# Patient Record
Sex: MALE | Race: WHITE | Employment: UNEMPLOYED | URBAN - METROPOLITAN AREA
[De-identification: names, ages, dates, MRNs, and addresses within clinical notes are randomized per-mention and may not be internally consistent; named-entity substitution may affect disease eponyms.]

---

## 2022-10-06 ENCOUNTER — APPOINTMENT (OUTPATIENT)
Dept: GENERAL RADIOLOGY | Age: 52
End: 2022-10-06
Payer: COMMERCIAL

## 2022-10-06 ENCOUNTER — HOSPITAL ENCOUNTER (EMERGENCY)
Age: 52
Discharge: HOME | End: 2022-10-06
Attending: EMERGENCY MEDICINE
Payer: COMMERCIAL

## 2022-10-06 VITALS
OXYGEN SATURATION: 98 % | RESPIRATION RATE: 16 BRPM | TEMPERATURE: 98.4 F | SYSTOLIC BLOOD PRESSURE: 142 MMHG | WEIGHT: 127 LBS | HEIGHT: 73 IN | DIASTOLIC BLOOD PRESSURE: 93 MMHG | BODY MASS INDEX: 16.83 KG/M2 | HEART RATE: 69 BPM

## 2022-10-06 DIAGNOSIS — L03.116 CELLULITIS OF LEFT LOWER EXTREMITY: Primary | ICD-10-CM

## 2022-10-06 PROCEDURE — 96372 THER/PROPH/DIAG INJ SC/IM: CPT

## 2022-10-06 PROCEDURE — 99284 EMERGENCY DEPT VISIT MOD MDM: CPT

## 2022-10-06 PROCEDURE — 6370000000 HC RX 637 (ALT 250 FOR IP): Performed by: EMERGENCY MEDICINE

## 2022-10-06 PROCEDURE — 6360000002 HC RX W HCPCS: Performed by: EMERGENCY MEDICINE

## 2022-10-06 PROCEDURE — 73630 X-RAY EXAM OF FOOT: CPT

## 2022-10-06 RX ORDER — NAPROXEN 500 MG/1
500 TABLET ORAL ONCE
Status: COMPLETED | OUTPATIENT
Start: 2022-10-06 | End: 2022-10-06

## 2022-10-06 RX ORDER — CEPHALEXIN 500 MG/1
500 CAPSULE ORAL 4 TIMES DAILY
Qty: 40 CAPSULE | Refills: 0 | Status: ON HOLD | OUTPATIENT
Start: 2022-10-06 | End: 2022-10-14 | Stop reason: HOSPADM

## 2022-10-06 RX ORDER — CEFTRIAXONE 1 G/1
1000 INJECTION, POWDER, FOR SOLUTION INTRAMUSCULAR; INTRAVENOUS ONCE
Status: COMPLETED | OUTPATIENT
Start: 2022-10-06 | End: 2022-10-06

## 2022-10-06 RX ORDER — NAPROXEN 500 MG/1
500 TABLET ORAL 2 TIMES DAILY
Qty: 10 TABLET | Refills: 0 | Status: SHIPPED | OUTPATIENT
Start: 2022-10-06

## 2022-10-06 RX ORDER — HYDROCODONE BITARTRATE AND ACETAMINOPHEN 5; 325 MG/1; MG/1
1 TABLET ORAL EVERY 6 HOURS PRN
Qty: 12 TABLET | Refills: 0 | Status: SHIPPED | OUTPATIENT
Start: 2022-10-06 | End: 2022-10-09

## 2022-10-06 RX ADMIN — NAPROXEN 500 MG: 500 TABLET ORAL at 11:14

## 2022-10-06 RX ADMIN — CEFTRIAXONE 1000 MG: 1 INJECTION, POWDER, FOR SOLUTION INTRAMUSCULAR; INTRAVENOUS at 11:15

## 2022-10-06 ASSESSMENT — PAIN SCALES - GENERAL
PAINLEVEL_OUTOF10: 0
PAINLEVEL_OUTOF10: 10

## 2022-10-06 ASSESSMENT — PAIN DESCRIPTION - ORIENTATION
ORIENTATION: LEFT
ORIENTATION: LEFT;ANTERIOR

## 2022-10-06 ASSESSMENT — PAIN - FUNCTIONAL ASSESSMENT
PAIN_FUNCTIONAL_ASSESSMENT: 0-10
PAIN_FUNCTIONAL_ASSESSMENT: 0-10

## 2022-10-06 ASSESSMENT — PAIN DESCRIPTION - ONSET: ONSET: ON-GOING

## 2022-10-06 ASSESSMENT — PAIN DESCRIPTION - PAIN TYPE: TYPE: CHRONIC PAIN

## 2022-10-06 ASSESSMENT — PAIN DESCRIPTION - LOCATION
LOCATION: FOOT
LOCATION: FOOT

## 2022-10-06 ASSESSMENT — PAIN DESCRIPTION - DESCRIPTORS
DESCRIPTORS: SHARP
DESCRIPTORS: THROBBING

## 2022-10-06 ASSESSMENT — PAIN DESCRIPTION - FREQUENCY: FREQUENCY: CONTINUOUS

## 2022-10-06 NOTE — ED PROVIDER NOTES
2000 \[facility]\"" ED  EMERGENCY DEPARTMENT ENCOUNTER      Pt Name: Marylou Alvarado  MRN: [id number]  [place] 1970  Date of evaluation: 10/6/2022  Provider: Miriam Bah DO    CHIEF COMPLAINT       Chief Complaint   Patient presents with    Foot Injury     Injured foot a year ago was seen at infectious disease doc in [place] given antibiotics. Went away and came back after medication was done. Complaint: Left foot redness and pain  History of chief complaint: This 65-year-old gentleman presents the emergency department complaining of redness and pain developing in the left foot. Patient states he noticed it feeling sore about 3 days ago started up with redness yesterday increased swelling redness and pain today, hurts to wear his shoe and walk. Patient states he has had a history of infection previously in that same area states he had originally injured his foot and at the top of his foot just above the little toe 4 years ago. Patient states previous infection about 2 years ago for which she was treated with antibiotics from infectious disease and it resolved. Patient states he has been good since that time states he does keep a little patch of dry skin over that area. Patient is not a diabetic patient denies any numb tingling or weakness to the extremity. No injury or trauma no fevers chills nausea vomiting weak or dizzy. Patient denies other complaints    Nursing Notes were reviewed. REVIEW OF SYSTEMS    (2-9 systems for level 4, 10 or more for level 5)     Review of Systems  Pertinent findings documented in the history of present illness  Except as noted above the remainder of the review of systems was reviewed and negative.        PAST MEDICAL HISTORY     Past Medical History:   Diagnosis Date    Asthma          SURGICAL HISTORY       Past Surgical History:   Procedure Laterality Date    KNEE SURGERY Left 2019    cartilage removed         CURRENT MEDICATIONS       Previous Medications ALBUTEROL (PROVENTIL HFA;VENTOLIN HFA) 108 (90 BASE) MCG/ACT INHALER    Inhale 2 puffs into the lungs every 6 hours as needed. ALLERGIES     Biaxin [clarithromycin] and Sodium pentobarbital [pentobarbital sodium]    FAMILY HISTORY     History reviewed. No pertinent family history. SOCIAL HISTORY       Social History     Socioeconomic History    Marital status: Single     Spouse name: None    Number of children: None    Years of education: None    Highest education level: None   Tobacco Use    Smoking status: Every Day     Packs/day: 0.50     Types: Cigarettes    Smokeless tobacco: Never   Vaping Use    Vaping Use: Never used   Substance and Sexual Activity    Alcohol use: Never    Drug use: Never       PHYSICAL EXAM    (up to 7 for level 4, 8 or more for level 5)     ED Triage Vitals [10/06/22 1036]   BP Temp Temp Source Heart Rate Resp SpO2 Height Weight   (!) 142/93 98.4 °F (36.9 °C) Temporal 69 16 98 % 6' 1\" (1.854 m) 127 lb (57.6 kg)       Physical Exam  General appearance: Patient is awake alert interactive appropriate nontoxic in no acute distress  Oral pharyngeal cavity is pink with good moisture, no exudates or ulcerations no asymmetry, the airway is widely patent  Neck: Supple no meningeal signs no adenopathy no JVD  Heart: Regular rate and rhythm no gross murmurs rubs or clicks  Lungs: Breath sounds are clear with good air movement throughout no active wheezes rales or rhonchi no respiratory distress  Abdomen: Soft nontender with good bowel sounds no rebound rigidity or guarding no firm or pulsatile masses, no gross distention, femoral pulses full and symmetric  Back: Nontender to palpation no costovertebral angle tenderness  Left foot: There is an area of patchy scaly dry skin over the dorsum of the foot between the area of the fourth and fifth metatarsals there is surrounding erythema and warmth with focal tenderness to palpation. ,  There is no lymphangitis, capillary refill is less than 2 seconds to the digits. There is palpable posterior tibial and dorsalis pedis pulse there is no pain on palpation about the ankle or lower leg no gross swelling. No asymmetry. DIAGNOSTIC RESULTS     RADIOLOGY:   Non-plain film images such as CT, Ultrasound and MRI are read by the radiologist. Plain radiographic images are visualized and preliminarily interpreted by the emergency physician with the below findings:      Interpretation per the Radiologist below, if available at the time of this note:    XR FOOT LEFT (MIN 3 VIEWS)    (Results Pending)         EMERGENCY DEPARTMENT COURSE and DIFFERENTIAL DIAGNOSIS/MDM:   Vitals:    Vitals:    10/06/22 1036   BP: (!) 142/93   Pulse: 69   Resp: 16   Temp: 98.4 °F (36.9 °C)   TempSrc: Temporal   SpO2: 98%   Weight: 127 lb (57.6 kg)   Height: 6' 1\" (1.854 m)     Treatment and course: Patient was given Rocephin 1 g IM initiated here, Ace wrap was applied for comfort and postop shoe given    FINAL IMPRESSION      1. Cellulitis of left lower extremity          DISPOSITION/PLAN   DISPOSITION    Patient discharged home advised rest warm soaks 3-4 times throughout the day home-going prescription for keflex for 10 days was written along with Naprosyn No. 10 Vicodin No. 12 patient advised to monitor closely and return if any change or worsening increasing redness discoloration numb tingling fever chills nausea or vomiting. Patient to follow-up with primary physician for repeat assessment in the next 2 days    PATIENT REFERRED TO:  Elvia Perales MD  [address]  [facility] [address] [phone]    In 2 days      DISCHARGE MEDICATIONS:  New Prescriptions    CEPHALEXIN (KEFLEX) 500 MG CAPSULE    Take 1 capsule by mouth 4 times daily for 10 days    HYDROCODONE-ACETAMINOPHEN (NORCO) 5-325 MG PER TABLET    Take 1 tablet by mouth every 6 hours as needed for Pain for up to 3 days. Intended supply: 3 days.  Take lowest dose possible to manage pain    NAPROXEN (NAPROSYN) 500 MG TABLET    Take 1 tablet by mouth 2 times daily     Controlled Substances Monitoring:     No flowsheet data found.     (Please note that portions of this note were completed with a voice recognition program.  Efforts were made to edit the dictations but occasionally words are mis-transcribed.)    Jelani Vogel DO (electronically signed)  Attending Emergency Physician            Jelani Vogel DO  10/06/22 6409

## 2022-10-06 NOTE — ED TRIAGE NOTES
Pt c/o left foot injury 1 year ago and had an infection. Not Pt returning to ER for c/o left foot swelling, redness and pain, onset 3 days ago. Pain rated 10/10, nothing taken for pain. Pt able to bear weight and walk but has pain with movement. Pt resp even, unlabored, skin w/d. Pt sent from Urgent Care to ER to be seen. Pt plan of care explained to Pt during bedside exam by Dr. Mandy Boyd.

## 2022-10-12 ENCOUNTER — HOSPITAL ENCOUNTER (INPATIENT)
Age: 52
LOS: 2 days | Discharge: HOME | DRG: 603 | End: 2022-10-14
Attending: EMERGENCY MEDICINE | Admitting: INTERNAL MEDICINE
Payer: COMMERCIAL

## 2022-10-12 DIAGNOSIS — Z78.9 FAILURE OF OUTPATIENT TREATMENT: ICD-10-CM

## 2022-10-12 DIAGNOSIS — L02.619 CELLULITIS AND ABSCESS OF FOOT: Primary | ICD-10-CM

## 2022-10-12 DIAGNOSIS — L03.119 CELLULITIS AND ABSCESS OF FOOT: Primary | ICD-10-CM

## 2022-10-12 PROBLEM — L03.90 CELLULITIS: Status: ACTIVE | Noted: 2022-10-12

## 2022-10-12 LAB
ALBUMIN SERPL-MCNC: 3.6 G/DL (ref 3.5–4.6)
ALP BLD-CCNC: 100 U/L (ref 35–104)
ALT SERPL-CCNC: 9 U/L (ref 0–41)
ANION GAP SERPL CALCULATED.3IONS-SCNC: 10 MEQ/L (ref 9–15)
AST SERPL-CCNC: 12 U/L (ref 0–40)
BASOPHILS ABSOLUTE: 0.1 K/UL (ref 0–0.1)
BASOPHILS RELATIVE PERCENT: 0.6 % (ref 0.2–1.2)
BILIRUB SERPL-MCNC: 0.3 MG/DL (ref 0.2–0.7)
BUN BLDV-MCNC: 11 MG/DL (ref 6–20)
CALCIUM SERPL-MCNC: 8.9 MG/DL (ref 8.5–9.9)
CHLORIDE BLD-SCNC: 105 MEQ/L (ref 95–107)
CO2: 25 MEQ/L (ref 20–31)
CREAT SERPL-MCNC: 0.91 MG/DL (ref 0.7–1.2)
EOSINOPHILS ABSOLUTE: 0.1 K/UL (ref 0–0.5)
EOSINOPHILS RELATIVE PERCENT: 1.5 % (ref 0.8–7)
GFR AFRICAN AMERICAN: >60
GFR NON-AFRICAN AMERICAN: >60
GLOBULIN: 3.1 G/DL (ref 2.3–3.5)
GLUCOSE BLD-MCNC: 113 MG/DL (ref 70–99)
HCT VFR BLD CALC: 42.4 % (ref 42–52)
HEMOGLOBIN: 13.6 G/DL (ref 13.7–17.5)
IMMATURE GRANULOCYTES #: 0 K/UL
IMMATURE GRANULOCYTES %: 0.2 %
LYMPHOCYTES ABSOLUTE: 2.8 K/UL (ref 1.3–3.6)
LYMPHOCYTES RELATIVE PERCENT: 31.9 %
MCH RBC QN AUTO: 30 PG (ref 25.7–32.2)
MCHC RBC AUTO-ENTMCNC: 32.1 % (ref 32.3–36.5)
MCV RBC AUTO: 93.6 FL (ref 79–92.2)
MONOCYTES ABSOLUTE: 0.5 K/UL (ref 0.3–0.8)
MONOCYTES RELATIVE PERCENT: 5.5 % (ref 5.3–12.2)
NEUTROPHILS ABSOLUTE: 5.3 K/UL (ref 1.8–5.4)
NEUTROPHILS RELATIVE PERCENT: 60.3 % (ref 34–67.9)
PDW BLD-RTO: 12.3 % (ref 11.6–14.4)
PLATELET # BLD: 309 K/UL (ref 163–337)
POTASSIUM SERPL-SCNC: 3.8 MEQ/L (ref 3.4–4.9)
RBC # BLD: 4.53 M/UL (ref 4.63–6.08)
SEDIMENTATION RATE, ERYTHROCYTE: 26 MM (ref 0–20)
SODIUM BLD-SCNC: 140 MEQ/L (ref 135–144)
TOTAL PROTEIN: 6.7 G/DL (ref 6.3–8)
WBC # BLD: 8.8 K/UL (ref 4.2–9)

## 2022-10-12 PROCEDURE — 80053 COMPREHEN METABOLIC PANEL: CPT

## 2022-10-12 PROCEDURE — 6370000000 HC RX 637 (ALT 250 FOR IP): Performed by: EMERGENCY MEDICINE

## 2022-10-12 PROCEDURE — 85025 COMPLETE CBC W/AUTO DIFF WBC: CPT

## 2022-10-12 PROCEDURE — 2580000003 HC RX 258: Performed by: EMERGENCY MEDICINE

## 2022-10-12 PROCEDURE — 87040 BLOOD CULTURE FOR BACTERIA: CPT

## 2022-10-12 PROCEDURE — 2580000003 HC RX 258: Performed by: INTERNAL MEDICINE

## 2022-10-12 PROCEDURE — 96365 THER/PROPH/DIAG IV INF INIT: CPT

## 2022-10-12 PROCEDURE — 6360000002 HC RX W HCPCS: Performed by: EMERGENCY MEDICINE

## 2022-10-12 PROCEDURE — 96375 TX/PRO/DX INJ NEW DRUG ADDON: CPT

## 2022-10-12 PROCEDURE — 99285 EMERGENCY DEPT VISIT HI MDM: CPT

## 2022-10-12 PROCEDURE — 85652 RBC SED RATE AUTOMATED: CPT

## 2022-10-12 PROCEDURE — 36415 COLL VENOUS BLD VENIPUNCTURE: CPT

## 2022-10-12 PROCEDURE — 6370000000 HC RX 637 (ALT 250 FOR IP): Performed by: INTERNAL MEDICINE

## 2022-10-12 PROCEDURE — 6360000002 HC RX W HCPCS: Performed by: INTERNAL MEDICINE

## 2022-10-12 PROCEDURE — 1210000000 HC MED SURG R&B

## 2022-10-12 RX ORDER — SODIUM CHLORIDE 0.9 % (FLUSH) 0.9 %
10 SYRINGE (ML) INJECTION PRN
Status: DISCONTINUED | OUTPATIENT
Start: 2022-10-12 | End: 2022-10-14 | Stop reason: HOSPADM

## 2022-10-12 RX ORDER — SODIUM CHLORIDE 0.9 % (FLUSH) 0.9 %
3 SYRINGE (ML) INJECTION EVERY 8 HOURS
Status: DISCONTINUED | OUTPATIENT
Start: 2022-10-12 | End: 2022-10-14 | Stop reason: HOSPADM

## 2022-10-12 RX ORDER — DIAPER,BRIEF,INFANT-TODD,DISP
EACH MISCELLANEOUS ONCE
Status: COMPLETED | OUTPATIENT
Start: 2022-10-12 | End: 2022-10-12

## 2022-10-12 RX ORDER — KETOROLAC TROMETHAMINE 30 MG/ML
30 INJECTION, SOLUTION INTRAMUSCULAR; INTRAVENOUS ONCE
Status: COMPLETED | OUTPATIENT
Start: 2022-10-12 | End: 2022-10-12

## 2022-10-12 RX ORDER — ONDANSETRON 2 MG/ML
4 INJECTION INTRAMUSCULAR; INTRAVENOUS EVERY 6 HOURS PRN
Status: DISCONTINUED | OUTPATIENT
Start: 2022-10-12 | End: 2022-10-14 | Stop reason: HOSPADM

## 2022-10-12 RX ORDER — SODIUM CHLORIDE 0.9 % (FLUSH) 0.9 %
10 SYRINGE (ML) INJECTION EVERY 12 HOURS SCHEDULED
Status: DISCONTINUED | OUTPATIENT
Start: 2022-10-12 | End: 2022-10-14 | Stop reason: HOSPADM

## 2022-10-12 RX ORDER — SODIUM CHLORIDE 9 MG/ML
INJECTION, SOLUTION INTRAVENOUS CONTINUOUS
Status: DISPENSED | OUTPATIENT
Start: 2022-10-12 | End: 2022-10-13

## 2022-10-12 RX ORDER — ALBUTEROL SULFATE 90 UG/1
2 AEROSOL, METERED RESPIRATORY (INHALATION) EVERY 4 HOURS PRN
Status: DISCONTINUED | OUTPATIENT
Start: 2022-10-12 | End: 2022-10-14 | Stop reason: HOSPADM

## 2022-10-12 RX ORDER — OXYCODONE HYDROCHLORIDE 5 MG/1
5 TABLET ORAL EVERY 4 HOURS PRN
Status: DISCONTINUED | OUTPATIENT
Start: 2022-10-12 | End: 2022-10-14 | Stop reason: HOSPADM

## 2022-10-12 RX ORDER — SODIUM CHLORIDE 9 MG/ML
INJECTION, SOLUTION INTRAVENOUS CONTINUOUS
Status: DISCONTINUED | OUTPATIENT
Start: 2022-10-12 | End: 2022-10-12

## 2022-10-12 RX ORDER — SODIUM CHLORIDE 9 MG/ML
INJECTION, SOLUTION INTRAVENOUS PRN
Status: DISCONTINUED | OUTPATIENT
Start: 2022-10-12 | End: 2022-10-14 | Stop reason: HOSPADM

## 2022-10-12 RX ORDER — ACETAMINOPHEN 650 MG/1
650 SUPPOSITORY RECTAL EVERY 6 HOURS PRN
Status: DISCONTINUED | OUTPATIENT
Start: 2022-10-12 | End: 2022-10-14 | Stop reason: HOSPADM

## 2022-10-12 RX ORDER — ENOXAPARIN SODIUM 100 MG/ML
40 INJECTION SUBCUTANEOUS DAILY
Status: DISCONTINUED | OUTPATIENT
Start: 2022-10-12 | End: 2022-10-14 | Stop reason: HOSPADM

## 2022-10-12 RX ORDER — PROMETHAZINE HYDROCHLORIDE 12.5 MG/1
12.5 TABLET ORAL EVERY 6 HOURS PRN
Status: DISCONTINUED | OUTPATIENT
Start: 2022-10-12 | End: 2022-10-14 | Stop reason: HOSPADM

## 2022-10-12 RX ORDER — ACETAMINOPHEN 325 MG/1
650 TABLET ORAL EVERY 6 HOURS PRN
Status: DISCONTINUED | OUTPATIENT
Start: 2022-10-12 | End: 2022-10-14 | Stop reason: HOSPADM

## 2022-10-12 RX ORDER — POLYETHYLENE GLYCOL 3350 17 G/17G
17 POWDER, FOR SOLUTION ORAL DAILY PRN
Status: DISCONTINUED | OUTPATIENT
Start: 2022-10-12 | End: 2022-10-14 | Stop reason: HOSPADM

## 2022-10-12 RX ADMIN — ACETAMINOPHEN 650 MG: 325 TABLET ORAL at 20:37

## 2022-10-12 RX ADMIN — VANCOMYCIN HYDROCHLORIDE 1000 MG: 1 INJECTION, POWDER, LYOPHILIZED, FOR SOLUTION INTRAVENOUS at 17:14

## 2022-10-12 RX ADMIN — SODIUM CHLORIDE: 9 INJECTION, SOLUTION INTRAVENOUS at 17:13

## 2022-10-12 RX ADMIN — SODIUM CHLORIDE: 9 INJECTION, SOLUTION INTRAVENOUS at 20:30

## 2022-10-12 RX ADMIN — BACITRACIN: 500 OINTMENT TOPICAL at 17:15

## 2022-10-12 RX ADMIN — OXYCODONE 5 MG: 5 TABLET ORAL at 22:50

## 2022-10-12 RX ADMIN — ENOXAPARIN SODIUM 40 MG: 100 INJECTION SUBCUTANEOUS at 20:40

## 2022-10-12 RX ADMIN — PIPERACILLIN AND TAZOBACTAM 3375 MG: 3; .375 INJECTION, POWDER, FOR SOLUTION INTRAVENOUS at 20:37

## 2022-10-12 RX ADMIN — KETOROLAC TROMETHAMINE 30 MG: 30 INJECTION, SOLUTION INTRAMUSCULAR; INTRAVENOUS at 17:11

## 2022-10-12 RX ADMIN — KETOROLAC TROMETHAMINE 30 MG: 30 INJECTION, SOLUTION INTRAMUSCULAR; INTRAVENOUS at 22:49

## 2022-10-12 ASSESSMENT — ENCOUNTER SYMPTOMS
CHEST TIGHTNESS: 0
SHORTNESS OF BREATH: 0
BACK PAIN: 0
BLOOD IN STOOL: 0
DIARRHEA: 0
WHEEZING: 0
TROUBLE SWALLOWING: 0
EYE DISCHARGE: 0
COUGH: 0
FACIAL SWELLING: 0
VOICE CHANGE: 0
ABDOMINAL PAIN: 0
SINUS PRESSURE: 0
SORE THROAT: 0
EYE REDNESS: 0
EYE PAIN: 0
VOMITING: 0
COLOR CHANGE: 1
CHOKING: 0
CONSTIPATION: 0
STRIDOR: 0

## 2022-10-12 ASSESSMENT — PAIN DESCRIPTION - ORIENTATION
ORIENTATION: LEFT

## 2022-10-12 ASSESSMENT — PAIN SCALES - GENERAL
PAINLEVEL_OUTOF10: 5
PAINLEVEL_OUTOF10: 6
PAINLEVEL_OUTOF10: 9
PAINLEVEL_OUTOF10: 10
PAINLEVEL_OUTOF10: 9

## 2022-10-12 ASSESSMENT — PAIN DESCRIPTION - LOCATION
LOCATION: FOOT

## 2022-10-12 ASSESSMENT — PAIN DESCRIPTION - DESCRIPTORS
DESCRIPTORS: THROBBING
DESCRIPTORS: SHARP;THROBBING

## 2022-10-12 NOTE — ED TRIAGE NOTES
Patient to ER with foot infection that has been ongoing for a few weeks. Family Doctor told patient to come to ER. Drainage present.  Electronically signed by Yoana Becerra RN on 10/12/2022 at 4:52 PM

## 2022-10-12 NOTE — ED NOTES
[id number] Dr Clemencia Mcmullen notified      Liss Eufemia  10/12/22 [phone]

## 2022-10-12 NOTE — ED PROVIDER NOTES
2000 [facility] ED  eMERGENCY dEPARTMENT eNCOUnter      Pt Name: Rissa Traore  MRN: [id number]  [place] 1970  Date of evaluation: 10/12/2022  Provider: Xenia Doyle MD    [address] [place] [address]       Chief Complaint   Patient presents with    Wound Infection     Left Foot          HISTORY OF PRESENT ILLNESS   (Location/Symptom, Timing/Onset,Context/Setting, Quality, Duration, Modifying Factors, Severity)  Note limiting factors. Rissa Traore is a 45 y.o. male who presents to the emergency department patient had failed outpatient treatment with Keflex seen by primary care physician in his office today and sent here for inpatient care patient was seen in this emergency on the sixth of this month x-ray was performed no foreign body no fracture noticeable on further x-ray patient has no fever no history diabetes mellitus patient noted to have redness and a scab on the left foot scab broke and drained patient has open sore on the dorsum of the left foot below his fourth and fifth toe along with redness of his foot and no numbness in his toes at the patient he had similar infection in the foot before and responded to IV antibiotics in the past  HPI    NursingNotes were reviewed. REVIEW OF SYSTEMS    (2-9 systems for level 4, 10 or more for level 5)     Review of Systems   Constitutional: Negative. Negative for activity change and fever. HENT:  Negative for congestion, drooling, facial swelling, mouth sores, nosebleeds, sinus pressure, sore throat, trouble swallowing and voice change. Eyes:  Negative for pain, discharge, redness and visual disturbance. Respiratory:  Negative for cough, choking, chest tightness, shortness of breath, wheezing and stridor. Cardiovascular:  Negative for chest pain, palpitations and leg swelling. Gastrointestinal:  Negative for abdominal pain, blood in stool, constipation, diarrhea and vomiting. Endocrine: Negative for cold intolerance, polyphagia and polyuria. Genitourinary:  Negative for dysuria, flank pain, frequency, genital sores and urgency. Musculoskeletal:  Negative for back pain, joint swelling, neck pain and neck stiffness. Skin:  Positive for color change and wound. Negative for pallor and rash. Neurological:  Negative for tremors, seizures, syncope, weakness, numbness and headaches. Hematological:  Negative for adenopathy. Does not bruise/bleed easily. Psychiatric/Behavioral:  Negative for agitation, behavioral problems, hallucinations and sleep disturbance. The patient is not hyperactive. All other systems reviewed and are negative. Except as noted above the remainder of the review of systems was reviewed and negative. PAST MEDICAL HISTORY     Past Medical History:   Diagnosis Date    Asthma     Emphysema lung ([address]          SURGICALHISTORY       Past Surgical History:   Procedure Laterality Date    KNEE SURGERY Left 2019    cartilage removed         CURRENT MEDICATIONS       Previous Medications    ALBUTEROL (PROVENTIL HFA;VENTOLIN HFA) 108 (90 BASE) MCG/ACT INHALER    Inhale 2 puffs into the lungs every 6 hours as needed. CEPHALEXIN (KEFLEX) 500 MG CAPSULE    Take 1 capsule by mouth 4 times daily for 10 days    NAPROXEN (NAPROSYN) 500 MG TABLET    Take 1 tablet by mouth 2 times daily       ALLERGIES     Biaxin [clarithromycin] and Sodium pentobarbital [pentobarbital sodium]    FAMILY HISTORY     History reviewed. No pertinent family history.        SOCIAL HISTORY       Social History     Socioeconomic History    Marital status: Single     Spouse name: None    Number of children: None    Years of education: None    Highest education level: None   Tobacco Use    Smoking status: Every Day     Packs/day: 0.50     Types: Cigarettes    Smokeless tobacco: Never   Vaping Use    Vaping Use: Never used   Substance and Sexual Activity    Alcohol use: Never    Drug use: Never       SCREENINGS    Moss Coma Scale  Eye Opening: Spontaneous  Best Verbal Response: Oriented  Best Motor Response: Obeys commands  Jomar Coma Scale Score: 15 @FLOW(99999862)@      PHYSICAL EXAM    (up to 7 for level 4, 8 or more for level 5)     ED Triage Vitals [10/12/22 1647]   BP Temp Temp Source Heart Rate Resp SpO2 Height Weight   136/77 97.6 °F (36.4 °C) Oral 57 20 99 % -- --       Physical Exam  Vitals and nursing note reviewed. Constitutional:       General: He is not in acute distress. Appearance: He is normal weight. He is not ill-appearing, toxic-appearing or diaphoretic. Comments: Alert cooperative patient nontoxic appearance ambulatory otherwise   HENT:      Head: Normocephalic. Right Ear: Tympanic membrane, ear canal and external ear normal.      Left Ear: Tympanic membrane and ear canal normal.      Nose: Nose normal.      Mouth/Throat:      Mouth: Mucous membranes are moist.   Eyes:      General:         Left eye: No discharge. Neck:      Vascular: No carotid bruit. Cardiovascular:      Rate and Rhythm: Normal rate and regular rhythm. Pulses: Normal pulses. Heart sounds: Normal heart sounds. No murmur heard. No friction rub. No gallop. Pulmonary:      Breath sounds: No stridor. No wheezing, rhonchi or rales. Chest:      Chest wall: No tenderness. Abdominal:      General: Bowel sounds are normal. There is no distension. Palpations: There is no mass. Tenderness: There is no abdominal tenderness. There is no right CVA tenderness, left CVA tenderness, guarding or rebound. Hernia: No hernia is present. Musculoskeletal:         General: Swelling and tenderness present. Cervical back: Neck supple. No rigidity or tenderness. Lymphadenopathy:      Cervical: No cervical adenopathy. Skin:     Coloration: Skin is not jaundiced. Findings: Erythema present. No bruising or lesion. Neurological:      General: No focal deficit present. Mental Status: He is alert. Cranial Nerves:  No cranial nerve deficit. Sensory: No sensory deficit. Motor: No weakness. Coordination: Coordination normal.      Gait: Gait normal.      Deep Tendon Reflexes: Reflexes normal.   Psychiatric:         Mood and Affect: Mood normal.       DIAGNOSTIC RESULTS     EKG: All EKG's are interpreted by the Emergency Department Physician who either signs or Co-signsthis chart in the absence of a cardiologist.        RADIOLOGY:   [place] such as CT, Ultrasound and MRI are read by the radiologist. Plain radiographic images are visualized and preliminarily interpreted by the emergency physician with the below findings:        Interpretation per the Radiologist below, if available at the time ofthis note:    No orders to display         ED BEDSIDE ULTRASOUND:   Performed by ED Physician - none    LABS:  Labs Reviewed   COMPREHENSIVE METABOLIC PANEL - Abnormal; Notable for the following components:       Result Value    Glucose 113 (*)     All other components within normal limits   CBC WITH AUTO DIFFERENTIAL - Abnormal; Notable for the following components:    RBC 4.53 (*)     Hemoglobin 13.6 (*)     MCV 93.6 (*)     MCHC 32.1 (*)     All other components within normal limits   CULTURE, BLOOD 1   CULTURE, BLOOD 2   SEDIMENTATION RATE       All other labs were within normal range or not returned as of this dictation.     EMERGENCY DEPARTMENT COURSE and DIFFERENTIAL DIAGNOSIS/MDM:   Vitals:    Vitals:    10/12/22 1647   BP: 136/77   Pulse: 57   Resp: 20   Temp: 97.6 °F (36.4 °C)   TempSrc: Oral   SpO2: 99%     MDM  Number of Diagnoses or Management Options  Cellulitis and abscess of foot  Failure of outpatient treatment  Diagnosis management comments: Patient was seen in this emergency on the sixth of this month started with Keflex x-ray was performed which did not show any foreign body or fracture patient has scab formation which broke loose and drained few days ago as per patient has a swelling and redness to the foot patient seen by his primary care physician today and sent here for inpatient care as redness is spreading in foot is swollen Case discussed with Dr. Moses De Leon, further specialist for her consultation agrees to see the patient requesting a admission to the hospitalist patient started with vancomycin IV, discussed with the hospitalist Dr. Madisyn Hidalgo at [phone] hrs. patient accepted for admission to the hospital       Amount and/or Complexity of Data Reviewed  Clinical lab tests: ordered and reviewed        CRITICAL CARE TIME   Total Critical Care time was minutes, excluding separately reportableprocedures. There was a high probability of clinicallysignificant/life threatening deterioration in the patient's condition which required my urgent intervention. CONSULTS:  None    PROCEDURES:  Unless otherwise noted below, none     Procedures    FINAL IMPRESSION      1. Cellulitis and abscess of foot    2. Failure of outpatient treatment          DISPOSITION/PLAN   DISPOSITION Decision To Admit 10/12/2022 05:43:35 PM      PATIENT REFERRED TO:  No follow-up provider specified.     DISCHARGE MEDICATIONS:  New Prescriptions    No medications on file          (Please note that portions of this note were completed with a voice recognition program.  Efforts were made to edit the dictations but occasionally words are mis-transcribed.)    Alan Sheikh MD (electronically signed)  Attending Emergency Physician        Alan Sheikh MD  10/12/22 3098

## 2022-10-12 NOTE — ED NOTES
Wound cleansed and dressed. Pt tolerated well.  Electronically signed by Lauren Salcedo RN on 10/12/2022 at 5:33 PM       Lauren Salcedo RN  10/12/22 [id number]

## 2022-10-13 LAB
ANION GAP SERPL CALCULATED.3IONS-SCNC: 7 MEQ/L (ref 9–15)
BASOPHILS ABSOLUTE: 0 K/UL (ref 0–0.1)
BASOPHILS RELATIVE PERCENT: 0.3 % (ref 0.2–1.2)
BUN BLDV-MCNC: 14 MG/DL (ref 6–20)
CALCIUM SERPL-MCNC: 8.2 MG/DL (ref 8.5–9.9)
CHLORIDE BLD-SCNC: 110 MEQ/L (ref 95–107)
CO2: 26 MEQ/L (ref 20–31)
CREAT SERPL-MCNC: 0.87 MG/DL (ref 0.7–1.2)
EOSINOPHILS ABSOLUTE: 0.1 K/UL (ref 0–0.5)
EOSINOPHILS RELATIVE PERCENT: 1.6 % (ref 0.8–7)
GFR AFRICAN AMERICAN: >60
GFR NON-AFRICAN AMERICAN: >60
GLUCOSE BLD-MCNC: 121 MG/DL (ref 70–99)
HCT VFR BLD CALC: 36.2 % (ref 42–52)
HEMOGLOBIN: 11.7 G/DL (ref 13.7–17.5)
IMMATURE GRANULOCYTES #: 0 K/UL
IMMATURE GRANULOCYTES %: 0.1 %
LYMPHOCYTES ABSOLUTE: 2.5 K/UL (ref 1.3–3.6)
LYMPHOCYTES RELATIVE PERCENT: 36 %
MCH RBC QN AUTO: 30.5 PG (ref 25.7–32.2)
MCHC RBC AUTO-ENTMCNC: 32.3 % (ref 32.3–36.5)
MCV RBC AUTO: 94.5 FL (ref 79–92.2)
MONOCYTES ABSOLUTE: 0.5 K/UL (ref 0.3–0.8)
MONOCYTES RELATIVE PERCENT: 6.9 % (ref 5.3–12.2)
NEUTROPHILS ABSOLUTE: 3.8 K/UL (ref 1.8–5.4)
NEUTROPHILS RELATIVE PERCENT: 55.1 % (ref 34–67.9)
PDW BLD-RTO: 12.3 % (ref 11.6–14.4)
PLATELET # BLD: 263 K/UL (ref 163–337)
POTASSIUM REFLEX MAGNESIUM: 3.8 MEQ/L (ref 3.4–4.9)
RBC # BLD: 3.83 M/UL (ref 4.63–6.08)
SODIUM BLD-SCNC: 143 MEQ/L (ref 135–144)
WBC # BLD: 6.9 K/UL (ref 4.2–9)

## 2022-10-13 PROCEDURE — 36415 COLL VENOUS BLD VENIPUNCTURE: CPT

## 2022-10-13 PROCEDURE — 6370000000 HC RX 637 (ALT 250 FOR IP): Performed by: INTERNAL MEDICINE

## 2022-10-13 PROCEDURE — 6370000000 HC RX 637 (ALT 250 FOR IP): Performed by: PODIATRIST

## 2022-10-13 PROCEDURE — 2580000003 HC RX 258: Performed by: INTERNAL MEDICINE

## 2022-10-13 PROCEDURE — 2580000003 HC RX 258: Performed by: EMERGENCY MEDICINE

## 2022-10-13 PROCEDURE — 80048 BASIC METABOLIC PNL TOTAL CA: CPT

## 2022-10-13 PROCEDURE — 1210000000 HC MED SURG R&B

## 2022-10-13 PROCEDURE — 85025 COMPLETE CBC W/AUTO DIFF WBC: CPT

## 2022-10-13 PROCEDURE — 6360000002 HC RX W HCPCS: Performed by: INTERNAL MEDICINE

## 2022-10-13 RX ORDER — NICOTINE 21 MG/24HR
1 PATCH, TRANSDERMAL 24 HOURS TRANSDERMAL DAILY
Status: DISCONTINUED | OUTPATIENT
Start: 2022-10-13 | End: 2022-10-14 | Stop reason: HOSPADM

## 2022-10-13 RX ADMIN — ENOXAPARIN SODIUM 40 MG: 100 INJECTION SUBCUTANEOUS at 09:04

## 2022-10-13 RX ADMIN — SODIUM CHLORIDE, PRESERVATIVE FREE 10 ML: 5 INJECTION INTRAVENOUS at 20:12

## 2022-10-13 RX ADMIN — DEXTROSE MONOHYDRATE 750 MG: 5 INJECTION, SOLUTION INTRAVENOUS at 16:44

## 2022-10-13 RX ADMIN — PIPERACILLIN AND TAZOBACTAM 3375 MG: 3; .375 INJECTION, POWDER, FOR SOLUTION INTRAVENOUS at 13:04

## 2022-10-13 RX ADMIN — PIPERACILLIN AND TAZOBACTAM 3375 MG: 3; .375 INJECTION, POWDER, FOR SOLUTION INTRAVENOUS at 04:52

## 2022-10-13 RX ADMIN — DEXTROSE MONOHYDRATE 750 MG: 5 INJECTION, SOLUTION INTRAVENOUS at 05:00

## 2022-10-13 RX ADMIN — Medication 3 ML: at 09:05

## 2022-10-13 RX ADMIN — PIPERACILLIN AND TAZOBACTAM 3375 MG: 3; .375 INJECTION, POWDER, FOR SOLUTION INTRAVENOUS at 20:15

## 2022-10-13 RX ADMIN — OXYCODONE 5 MG: 5 TABLET ORAL at 20:12

## 2022-10-13 RX ADMIN — SODIUM CHLORIDE, PRESERVATIVE FREE 10 ML: 5 INJECTION INTRAVENOUS at 09:05

## 2022-10-13 RX ADMIN — Medication 3 ML: at 16:39

## 2022-10-13 RX ADMIN — HYOSCYAMINE SULFATE: 16 SOLUTION at 16:38

## 2022-10-13 ASSESSMENT — PAIN SCALES - GENERAL
PAINLEVEL_OUTOF10: 0
PAINLEVEL_OUTOF10: 5
PAINLEVEL_OUTOF10: 3
PAINLEVEL_OUTOF10: 7

## 2022-10-13 ASSESSMENT — PAIN DESCRIPTION - DESCRIPTORS
DESCRIPTORS: ACHING
DESCRIPTORS: ACHING

## 2022-10-13 ASSESSMENT — PAIN DESCRIPTION - FREQUENCY: FREQUENCY: CONTINUOUS

## 2022-10-13 ASSESSMENT — PAIN DESCRIPTION - LOCATION
LOCATION: FOOT
LOCATION: FOOT

## 2022-10-13 ASSESSMENT — PAIN DESCRIPTION - ORIENTATION
ORIENTATION: LEFT
ORIENTATION: LEFT

## 2022-10-13 ASSESSMENT — PAIN DESCRIPTION - PAIN TYPE: TYPE: ACUTE PAIN

## 2022-10-13 ASSESSMENT — PAIN DESCRIPTION - ONSET: ONSET: ON-GOING

## 2022-10-13 NOTE — CONSULTS
PODIATRIC MEDICINE AND SURGERY       CONSULT HISTORY AND PHYSICAL       ASSESSMENT:  This 45 y.o. male with infected open wound left foot      Plan:  Patient examined and evaluated  Previous and today's labs reviewed   Prior imaging reviewed   FINDINGS:   No fracture or dislocation. No findings of acute osteomyelitis. Normal soft   tissues. Dressing change orders placed for dakins wet to dry, patient instructed on continuing at home  No debridement indicated today     Discussed with patient outpatient wound care management. HPI: This very pleasant 45y.o. year old male with PMH of asthma, emphysema seen today for left dorsal foot wound. Patient states that it started as an infected blister which drained pus about 2 days ago, noted improvement in pain after that occurred. States he was sent to ED as wound had not healed with outpatient oral antibiotics. States he had similar symptoms that started as a rash between his toes left foot a few years ago. Patient deniea nausea, vomiting, diarrhea, fevers, chills, chest pain, shortness of breath, head ache, or calf pain. No other pedal complaints. Past Medical History:   Diagnosis Date    Asthma     Emphysema lung ([facility] [address]        Past Surgical History:   Procedure Laterality Date    KNEE SURGERY Left 2019    cartilage removed       [unfilled]    Allergies   Allergen Reactions    Biaxin [Clarithromycin]     Sodium Pentobarbital [Pentobarbital Sodium]        History reviewed. No pertinent family history.     Social History     Socioeconomic History    Marital status: Single     Spouse name: Not on file    Number of children: Not on file    Years of education: Not on file    Highest education level: Not on file   Occupational History    Not on file   Tobacco Use    Smoking status: Every Day     Packs/day: 0.50     Types: Cigarettes    Smokeless tobacco: Never   Vaping Use    Vaping Use: Never used   Substance and Sexual Activity    Alcohol use: Never Drug use: Never    Sexual activity: Not on file   Other Topics Concern    Not on file   Social History Narrative    Not on file     Social Determinants of Health     Financial Resource Strain: Not on file   Food Insecurity: Not on file   Transportation Needs: Not on file   Physical Activity: Not on file   Stress: Not on file   Social Connections: Not on file   Intimate Partner Violence: Not on file   Housing Stability: Not on file         REVIEW OF SYSTEMS:  CONSTITUTIONAL:  No fevers, chills, nightsweats, unintended weight loss  HEENT:  Denies frequent or severe headaches, nasal congestion/sinus symptoms, problematic allergy problems. EYES:  No diplopia or blurry vision. CARDIOVASCULAR:  No chest pain, dyspnea, palpitations, orthopnea  PULM:  No dyspnea, unexplained cough. GI:  No dysphagia/odynophagia, problematic reflux, constipation, diarrhea, changes in stool habits, hematochezia, melena. :  No new urinary complaints, including dysuria, gross hematuria or pyuria. NEURO:  No new balance problems, peripheral weakness/paresthesias or numbness of concern. MUSC-SKEL:  No new joint pain, swelling, or erythema. PSY:  No concerns regarding depression, anxiety or panic. INTEGUMENTARY:  No new skin changes (rash, new or changing mole, new growth)      OBJECTIVE:  BP (!) 120/59   Pulse (!) 41   Temp 97.5 °F (36.4 °C)   Resp 16   Ht 6' 1\" (1.854 m)   Wt 128 lb 6.4 oz (58.2 kg)   SpO2 97%   BMI 16.94 kg/m²   Patient is alert and oriented x 3 in NAD.      Vascular:   +2 palpable Dorsalis Pedis and Posterior Tibial Pulses B/L  Capillary Fill time < 3 seconds to B/L digits  Skin temperature warm to warm tibial tuberosity to the digits B/L  Hair growth positive to digits  Mild focal edema and erythema to periwound area    Neurological:   Epicritic sensation intact B/L    Musculoskeletal/Orthopaedic:   Structural Deformities: none noted  5/5 muscle strength Dorsiflexion, Plantarflexion, Inversion, Eversion

## 2022-10-13 NOTE — PROGRESS NOTES
Assumed patient care. He is awake in bed and talking on phone. A&O x 4. Pleasant and cooperative. Respirations even and unlabored on room air. Up ad maria in room. C/O pain in left foot. Will medicate per orders. No other complaints voiced. No s/s distress noted.

## 2022-10-13 NOTE — PROGRESS NOTES
Patient arrives to room 212 in w/c accompanied by this nurse. Transfers self into bed. A&O x 4. Pleasant and cooperative. Respirations even and unlabored. Oriented to room and bed controls. Please see flowsheets for complete assessment.

## 2022-10-13 NOTE — H&P
Hospital Medicine History & Physical      PCP: Jana Kilpatrick MD    Date of Admission: 10/12/2022    Date of Service: 10/13/22      Chief Complaint:  L dorsal foot wound      History Of Present Illness:  45 y.o. male who presented to [facility] with above complains. He has chronic R foot ulcer for the past 3-4 years with previous treatment by atbs, about last 2 weeks noted increased redness/edema/pain in L foot, came to ER week ago and was prescribed PO keflex. Was reevaluated in PCP office and was advised to reevaluated in ER due to failure PO atbs therapy. Denied fever, dyspnea, CP. After initial stabilization he was admitted for further management        Past Medical History:          Diagnosis Date    Asthma     Emphysema lung ([address]        Past Surgical History:          Procedure Laterality Date    KNEE SURGERY Left 2019    cartilage removed       Medications Prior to Admission:      Prior to Admission medications    Medication Sig Start Date End Date Taking? Authorizing Provider   cephALEXin (KEFLEX) 500 MG capsule Take 1 capsule by mouth 4 times daily for 10 days 10/6/22 10/16/22  Minus Pile, DO   naproxen (NAPROSYN) 500 MG tablet Take 1 tablet by mouth 2 times daily 10/6/22   Minus Pile, DO   albuterol (PROVENTIL HFA;VENTOLIN HFA) 108 (90 BASE) MCG/ACT inhaler Inhale 2 puffs into the lungs every 6 hours as needed. Historical Provider, MD       Allergies:  Biaxin [clarithromycin] and Sodium pentobarbital [pentobarbital sodium]    Social History:      The patient currently lives home    TOBACCO:   reports that he has been smoking cigarettes. He has been smoking an average of .5 packs per day. He has never used smokeless tobacco.  ETOH:   reports no history of alcohol use. Family History:       Reviewed in detail and negative for DM, CAD, Cancer, CVA. Positive as follows:    History reviewed. No pertinent family history. REVIEW OF SYSTEMS:   Pertinent positives as noted in the HPI. All other systems reviewed and negative. PHYSICAL EXAM:    BP (!) 120/59   Pulse (!) 41   Temp 97.5 °F (36.4 °C)   Resp 16   Ht 6' 1\" (1.854 m)   Wt 128 lb 6.4 oz (58.2 kg)   SpO2 97%   BMI 16.94 kg/m²     General appearance:  No apparent distress, appears stated age and cooperative. HEENT:  Normal cephalic, atraumatic without obvious deformity. Pupils equal, round, and reactive to light. Extra ocular muscles intact. Conjunctivae/corneas clear. Neck: Supple, with full range of motion. No jugular venous distention. Trachea midline. Respiratory:  Normal respiratory effort. Clear to auscultation, bilaterally without Rales/Wheezes/Rhonchi. Cardiovascular:  Regular rate and rhythm with normal S1/S2 without murmurs, rubs or gallops. Abdomen: Soft, non-tender, non-distended with normal bowel sounds. Musculoskeletal:  No clubbing, cyanosis or edema bilaterally. Full range of motion without deformity. Skin: L foot dorsal ulcer/wound with clear discharge   Neurologic:  Neurovascularly intact without any focal sensory/motor deficits. Cranial nerves: II-XII intact, grossly non-focal.  Psychiatric:  Alert and oriented, thought content appropriate, normal insight  Capillary Refill: Brisk,< 3 seconds   Peripheral Pulses: +2 palpable, equal bilaterally       Labs:     Recent Labs     10/12/22  1720 10/13/22  0602   WBC 8.8 6.9   HGB 13.6* 11.7*   HCT 42.4 36.2*    263     Recent Labs     10/12/22  1720 10/13/22  0602    143   K 3.8 3.8    110*   CO2 25 26   BUN 11 14   CREATININE 0.91 0.87   CALCIUM 8.9 8.2*     Recent Labs     10/12/22  1720   AST 12   ALT 9   BILITOT 0.3   ALKPHOS 100     No results for input(s): INR in the last 72 hours. No results for input(s): Barbara Hull in the last 72 hours.     Urinalysis:      Lab Results   Component Value Date/Time    NITRU NEG 05/08/2012 03:51 PM    BLOODU NEG 05/08/2012 03:51 PM    BLOODU trace 05/08/2012 12:18 PM    SPECGRAV 1.013 05/08/2012 03:51 PM    SPECGRAV 1.010 05/08/2012 12:18 PM    GLUCOSEU NEG 05/08/2012 03:51 PM    GLUCOSEU neg 05/08/2012 12:18 PM       Radiology:     CXR: I have reviewed the CXR with the following interpretation:   EKG:  I have reviewed the EKG with the following interpretation:     No orders to display       ASSESSMENT:    Active Hospital Problems    Diagnosis Date Noted    Cellulitis [L03.90] 10/12/2022     Priority: Medium       PLAN:        DVT Prophylaxis:   Diet: ADULT DIET; Regular  Code Status: Full Code    PT/OT Eval Status:     Dispo - L foot ulceration/cellulitis-failure outpatient therapy- atbs initiated, podiatry to see pt  Smoking- advice to stop  Medically stable for acute admission at Tanja Kearney MD    Thank you Rosanne Trimble MD for the opportunity to be involved in this patient's care. If you have any questions or concerns please feel free to contact me.

## 2022-10-13 NOTE — PROGRESS NOTES
[facility] Pharmacokinetic Monitoring Service - Vancomycin     Garry Skinner is a 45 y.o. male starting on vancomycin therapy for cellulitis. Pharmacy consulted by Dr Montrell Mota for monitoring and adjustment. Target Concentration: Goal AUC/FERNANDO 400-600 mg*hr/L    Additional Antimicrobials: zosyn    Pertinent Laboratory Values: Wt Readings from Last 1 Encounters:   10/12/22 128 lb 6.4 oz (58.2 kg)     Temp Readings from Last 1 Encounters:   10/12/22 97.6 °F (36.4 °C) (Oral)     Estimated Creatinine Clearance: 78 mL/min (based on SCr of 0.91 mg/dL).   Recent Labs     10/12/22  1720   CREATININE 0.91   WBC 8.8     Procalcitonin: not available    Pertinent Cultures:  Culture Date Source Results   10/12/22 blood pending   MRSA Nasal Swab: not ordered non-respiratory    Plan:  Dosing recommendations based on Bayesian software  Start vancomycin 1000 mg x 1 given in ED, then continue vancomycin 750 mg IV q 12 hours  Anticipated AUC of 464 and trough concentration of 14.5 at steady state  Renal labs as indicated   Vancomycin concentration ordered for 10/14 @ 1600 based on admin time proximity to am lab draws   Pharmacy will continue to monitor patient and adjust therapy as indicated    Thank you for the consult,  DAYRON Amador [facility] [place]  10/12/2022 8:01 PM

## 2022-10-14 VITALS
TEMPERATURE: 97.7 F | RESPIRATION RATE: 16 BRPM | OXYGEN SATURATION: 100 % | HEIGHT: 73 IN | HEART RATE: 41 BPM | SYSTOLIC BLOOD PRESSURE: 142 MMHG | WEIGHT: 128.4 LBS | DIASTOLIC BLOOD PRESSURE: 65 MMHG | BODY MASS INDEX: 17.02 KG/M2

## 2022-10-14 PROCEDURE — 6370000000 HC RX 637 (ALT 250 FOR IP): Performed by: INTERNAL MEDICINE

## 2022-10-14 PROCEDURE — 2580000003 HC RX 258: Performed by: INTERNAL MEDICINE

## 2022-10-14 PROCEDURE — 2580000003 HC RX 258: Performed by: EMERGENCY MEDICINE

## 2022-10-14 PROCEDURE — 6360000002 HC RX W HCPCS: Performed by: INTERNAL MEDICINE

## 2022-10-14 RX ORDER — DOXYCYCLINE HYCLATE 100 MG
100 TABLET ORAL 2 TIMES DAILY
Qty: 20 TABLET | Refills: 0 | Status: SHIPPED | OUTPATIENT
Start: 2022-10-14 | End: 2022-10-24

## 2022-10-14 RX ADMIN — PIPERACILLIN AND TAZOBACTAM 3375 MG: 3; .375 INJECTION, POWDER, FOR SOLUTION INTRAVENOUS at 04:34

## 2022-10-14 RX ADMIN — ENOXAPARIN SODIUM 40 MG: 100 INJECTION SUBCUTANEOUS at 08:32

## 2022-10-14 RX ADMIN — DEXTROSE MONOHYDRATE 750 MG: 5 INJECTION, SOLUTION INTRAVENOUS at 04:35

## 2022-10-14 RX ADMIN — HYOSCYAMINE SULFATE: 16 SOLUTION at 08:35

## 2022-10-14 RX ADMIN — SODIUM CHLORIDE, PRESERVATIVE FREE 10 ML: 5 INJECTION INTRAVENOUS at 08:35

## 2022-10-14 RX ADMIN — Medication 3 ML: at 08:34

## 2022-10-14 NOTE — DISCHARGE SUMMARY
Discharge Summary    Patient:  Pamela Camejo  YOB: 1970    MRN: [id number]   Acct: [de-identified]    Primary Care Physician: Blayne Roger MD    Admit date:  10/12/2022    Discharge date:   10/14/22      Discharge Diagnoses:   Cellulitis  Principal Problem:    Cellulitis  Resolved Problems:    * No resolved hospital problems. *      Admitted for: [facility] Course: patient admitted with L foot dorsal open wound, failure outpatient atbs therapy. Was evaluated by podiatry and recommended to follow up with wound clinic after Dc.  At DC dakins and PO doxy will be prescribed     Consultants:  podiatry     Discharge Medications:       Medication List        START taking these medications      doxycycline hyclate 100 MG tablet  Commonly known as: VIBRA-TABS  Take 1 tablet by mouth 2 times daily for 10 days     Sodium Hypochlorite 0.25 % Soln  Commonly known as: DAKINS  Irrigate with 5 mLs as directed daily  Start taking on: October 15, 2022            Richie Ayalayra taking these medications      albuterol sulfate  (90 Base) MCG/ACT inhaler  Commonly known as: PROVENTIL;VENTOLIN;PROAIR     naproxen 500 MG tablet  Commonly known as: Naprosyn  Take 1 tablet by mouth 2 times daily            STOP taking these medications      cephALEXin 500 MG capsule  Commonly known as: Cindra Puna               Where to Get Your Medications        These medications were sent to [address] [phone]  [address] [place] [id number]      Hours: 24-hours Phone: [phone]   doxycycline hyclate 100 MG tablet  Sodium Hypochlorite 0.25 % Soln         Physical Exam:    Vitals:  Vitals:    10/13/22 1949 10/13/22 2012 10/13/22 2042 10/14/22 0518   BP: 135/65   (!) 142/65   Pulse: (!) 48   (!) 41   Resp: 16 16 18 16   Temp: 97.9 °F (36.6 °C)   97.7 °F (36.5 °C)   TempSrc: Oral   Oral   SpO2: 99%   100%   Weight:       Height:         Weight: Weight: 128 lb 6.4 oz (58.2 kg)     24 hour intake/output:No intake or output data in the 24 hours ending 10/14/22 0855    General appearance - alert, well appearing, and in no distress  Chest - clear to auscultation, no wheezes, rales or rhonchi, symmetric air entry  Heart - normal rate, regular rhythm, normal S1, S2, no murmurs, rubs, clicks or gallops  Abdomen - soft, nontender, nondistended, no masses or organomegaly  Obese: No; Protuberant: No   Neurological - alert, oriented, normal speech, no focal findings or movement disorder noted  Extremities - L dorsal foot open wound   Skin -     Procedures:      Diagnostic Test:      Radiology reports as per the Radiologist  Radiology: No results found. Results for orders placed or performed during the hospital encounter of 10/12/22   Culture, Blood 1    Specimen: Blood   Result Value Ref Range    Blood Culture, Routine       No Growth to date. Any change in status will be called. Culture, Blood 2    Specimen: Blood   Result Value Ref Range    Culture, Blood 2       No Growth to date. Any change in status will be called.    Comprehensive Metabolic Panel   Result Value Ref Range    Sodium 140 135 - 144 mEq/L    Potassium 3.8 3.4 - 4.9 mEq/L    Chloride 105 95 - 107 mEq/L    CO2 25 20 - 31 mEq/L    Anion Gap 10 9 - 15 mEq/L    Glucose 113 (H) 70 - 99 mg/dL    BUN 11 6 - 20 mg/dL    Creatinine 0.91 0.70 - 1.20 mg/dL    GFR Non-African American >60.0 >60    GFR  >60.0 >60    Calcium 8.9 8.5 - 9.9 mg/dL    Total Protein 6.7 6.3 - 8.0 g/dL    Albumin 3.6 3.5 - 4.6 g/dL    Total Bilirubin 0.3 0.2 - 0.7 mg/dL    Alkaline Phosphatase 100 35 - 104 U/L    ALT 9 0 - 41 U/L    AST 12 0 - 40 U/L    Globulin 3.1 2.3 - 3.5 g/dL   CBC with Auto Differential   Result Value Ref Range    WBC 8.8 4.2 - 9.0 K/uL    RBC 4.53 (L) 4.63 - 6.08 M/uL    Hemoglobin 13.6 (L) 13.7 - 17.5 g/dL    Hematocrit 42.4 42.0 - 52.0 %    MCV 93.6 (H) 79.0 - 92.2 fL    MCH 30.0 25.7 - 32.2 pg    MCHC 32.1 (L) 32.3 - 36.5 %    RDW 12.3 11.6 - 14.4 %    Platelets 456 427 - 478 K/uL    Neutrophils % 60.3 34.0 - 67.9 %    Immature Granulocytes % 0.2 %    Lymphocytes % 31.9 %    Monocytes % 5.5 5.3 - 12.2 %    Eosinophils % 1.5 0.8 - 7.0 %    Basophils % 0.6 0.2 - 1.2 %    Neutrophils Absolute 5.3 1.8 - 5.4 K/uL    Immature Granulocytes # 0.0 K/uL    Lymphocytes Absolute 2.8 1.3 - 3.6 K/uL    Monocytes Absolute 0.5 0.3 - 0.8 K/uL    Eosinophils Absolute 0.1 0.0 - 0.5 K/uL    Basophils Absolute 0.1 0.0 - 0.1 K/uL   Sedimentation Rate   Result Value Ref Range    Sed Rate 26 (H) 0 - 20 mm   Basic Metabolic Panel w/ Reflex to MG   Result Value Ref Range    Sodium 143 135 - 144 mEq/L    Potassium reflex Magnesium 3.8 3.4 - 4.9 mEq/L    Chloride 110 (H) 95 - 107 mEq/L    CO2 26 20 - 31 mEq/L    Anion Gap 7 (L) 9 - 15 mEq/L    Glucose 121 (H) 70 - 99 mg/dL    BUN 14 6 - 20 mg/dL    Creatinine 0.87 0.70 - 1.20 mg/dL    GFR Non-African American >60.0 >60    GFR  >60.0 >60    Calcium 8.2 (L) 8.5 - 9.9 mg/dL   CBC with Auto Differential   Result Value Ref Range    WBC 6.9 4.2 - 9.0 K/uL    RBC 3.83 (L) 4.63 - 6.08 M/uL    Hemoglobin 11.7 (L) 13.7 - 17.5 g/dL    Hematocrit 36.2 (L) 42.0 - 52.0 %    MCV 94.5 (H) 79.0 - 92.2 fL    MCH 30.5 25.7 - 32.2 pg    MCHC 32.3 32.3 - 36.5 %    RDW 12.3 11.6 - 14.4 %    Platelets 206 825 - 783 K/uL    Neutrophils % 55.1 34.0 - 67.9 %    Immature Granulocytes % 0.1 %    Lymphocytes % 36.0 %    Monocytes % 6.9 5.3 - 12.2 %    Eosinophils % 1.6 0.8 - 7.0 %    Basophils % 0.3 0.2 - 1.2 %    Neutrophils Absolute 3.8 1.8 - 5.4 K/uL    Immature Granulocytes # 0.0 K/uL    Lymphocytes Absolute 2.5 1.3 - 3.6 K/uL    Monocytes Absolute 0.5 0.3 - 0.8 K/uL    Eosinophils Absolute 0.1 0.0 - 0.5 K/uL    Basophils Absolute 0.0 0.0 - 0.1 K/uL       Diet:  ADULT DIET;  Regular    Activity:  Activity as tolerated (Patient may move about with assist as indicated or with supervision.)    Follow-up:  in 1 weeks with Briana Deluca MD,      Disposition: home    Condition: Stable    Time Spent: 50 minutes    Electronically signed by Natalie Huerta MD on 10/14/2022 at 8:55 AM    Discharging Hospitalist

## 2022-10-18 LAB
BLOOD CULTURE, ROUTINE: NORMAL
CULTURE, BLOOD 2: NORMAL

## 2023-04-08 ENCOUNTER — OFFICE VISIT (OUTPATIENT)
Dept: FAMILY MEDICINE CLINIC | Age: 53
End: 2023-04-08

## 2023-04-08 VITALS
OXYGEN SATURATION: 100 % | HEART RATE: 77 BPM | DIASTOLIC BLOOD PRESSURE: 76 MMHG | BODY MASS INDEX: 16.96 KG/M2 | TEMPERATURE: 97.5 F | SYSTOLIC BLOOD PRESSURE: 130 MMHG | WEIGHT: 128 LBS | RESPIRATION RATE: 16 BRPM | HEIGHT: 73 IN

## 2023-04-08 DIAGNOSIS — J20.9 ACUTE BRONCHITIS WITH COPD (HCC): ICD-10-CM

## 2023-04-08 DIAGNOSIS — J01.10 ACUTE FRONTAL SINUSITIS, RECURRENCE NOT SPECIFIED: Primary | ICD-10-CM

## 2023-04-08 DIAGNOSIS — J44.0 ACUTE BRONCHITIS WITH COPD (HCC): ICD-10-CM

## 2023-04-08 LAB
INFLUENZA A ANTIBODY: NEGATIVE
INFLUENZA B ANTIBODY: NEGATIVE
Lab: NORMAL
PERFORMING INSTRUMENT: NORMAL
QC PASS/FAIL: NORMAL
S PYO AG THROAT QL: NORMAL
SARS-COV-2, POC: NORMAL

## 2023-04-08 RX ORDER — AMOXICILLIN AND CLAVULANATE POTASSIUM 875; 125 MG/1; MG/1
1 TABLET, FILM COATED ORAL 2 TIMES DAILY
Qty: 14 TABLET | Refills: 0 | Status: SHIPPED | OUTPATIENT
Start: 2023-04-08 | End: 2023-04-15

## 2023-04-08 RX ORDER — CETIRIZINE HYDROCHLORIDE, PSEUDOEPHEDRINE HYDROCHLORIDE 5; 120 MG/1; MG/1
1 TABLET, FILM COATED, EXTENDED RELEASE ORAL 2 TIMES DAILY
Qty: 60 TABLET | Refills: 0 | Status: SHIPPED | OUTPATIENT
Start: 2023-04-08 | End: 2023-05-08

## 2023-04-08 RX ORDER — GUAIFENESIN 600 MG/1
1200 TABLET, EXTENDED RELEASE ORAL 2 TIMES DAILY
Qty: 40 TABLET | Refills: 0 | Status: SHIPPED | OUTPATIENT
Start: 2023-04-08 | End: 2023-04-18

## 2023-04-08 RX ORDER — DEXTROMETHORPHAN HYDROBROMIDE AND PROMETHAZINE HYDROCHLORIDE 15; 6.25 MG/5ML; MG/5ML
5 SYRUP ORAL 4 TIMES DAILY PRN
Qty: 150 ML | Refills: 0 | Status: SHIPPED | OUTPATIENT
Start: 2023-04-08 | End: 2023-04-16

## 2023-04-08 RX ORDER — ALBUTEROL SULFATE 2.5 MG/3ML
2.5 SOLUTION RESPIRATORY (INHALATION) 4 TIMES DAILY PRN
Qty: 120 EACH | Refills: 3 | Status: SHIPPED | OUTPATIENT
Start: 2023-04-08

## 2023-04-08 SDOH — ECONOMIC STABILITY: FOOD INSECURITY: WITHIN THE PAST 12 MONTHS, YOU WORRIED THAT YOUR FOOD WOULD RUN OUT BEFORE YOU GOT MONEY TO BUY MORE.: NEVER TRUE

## 2023-04-08 SDOH — ECONOMIC STABILITY: HOUSING INSECURITY
IN THE LAST 12 MONTHS, WAS THERE A TIME WHEN YOU DID NOT HAVE A STEADY PLACE TO SLEEP OR SLEPT IN A SHELTER (INCLUDING NOW)?: NO

## 2023-04-08 SDOH — ECONOMIC STABILITY: INCOME INSECURITY: HOW HARD IS IT FOR YOU TO PAY FOR THE VERY BASICS LIKE FOOD, HOUSING, MEDICAL CARE, AND HEATING?: NOT HARD AT ALL

## 2023-04-08 SDOH — ECONOMIC STABILITY: FOOD INSECURITY: WITHIN THE PAST 12 MONTHS, THE FOOD YOU BOUGHT JUST DIDN'T LAST AND YOU DIDN'T HAVE MONEY TO GET MORE.: NEVER TRUE

## 2023-04-08 ASSESSMENT — PATIENT HEALTH QUESTIONNAIRE - PHQ9
SUM OF ALL RESPONSES TO PHQ QUESTIONS 1-9: 0
SUM OF ALL RESPONSES TO PHQ9 QUESTIONS 1 & 2: 0
1. LITTLE INTEREST OR PLEASURE IN DOING THINGS: 0
SUM OF ALL RESPONSES TO PHQ QUESTIONS 1-9: 0
2. FEELING DOWN, DEPRESSED OR HOPELESS: 0

## 2023-04-08 NOTE — PROGRESS NOTES
Every Day     Packs/day: 0.50     Types: Cigarettes    Smokeless tobacco: Never   Vaping Use    Vaping Use: Never used   Substance and Sexual Activity    Alcohol use: Never    Drug use: Never    Sexual activity: Not on file   Other Topics Concern    Not on file   Social History Narrative    Not on file     Social Determinants of Health     Financial Resource Strain: Low Risk     Difficulty of Paying Living Expenses: Not hard at all   Food Insecurity: No Food Insecurity    Worried About Running Out of Food in the Last Year: Never true    [address] in the Last Year: Never true   Transportation Needs: Unknown    Lack of Transportation (Medical): Not on file    Lack of Transportation (Non-Medical): No   Physical Activity: Not on file   Stress: Not on file   Social Connections: Not on file   Intimate Partner Violence: Not on file   Housing Stability: Unknown    Unable to Pay for Housing in the Last Year: Not on file    Number of Places Lived in the Last Year: Not on file    Unstable Housing in the Last Year: No     History reviewed. No pertinent family history.   Allergies   Allergen Reactions    Biaxin [Clarithromycin]     Sodium Pentobarbital [Pentobarbital Sodium]      Current Outpatient Medications   Medication Sig Dispense Refill    amoxicillin-clavulanate (AUGMENTIN) 875-125 MG per tablet Take 1 tablet by mouth 2 times daily for 7 days 14 tablet 0    guaiFENesin (MUCINEX) 600 MG extended release tablet Take 2 tablets by mouth 2 times daily for 10 days 40 tablet 0    cetirizine-psuedoephedrine (ZYRTEC-D) 5-120 MG per extended release tablet Take 1 tablet by mouth 2 times daily 60 tablet 0    promethazine-dextromethorphan (PROMETHAZINE-DM) 6.25-15 MG/5ML syrup Take 5 mLs by mouth 4 times daily as needed for Cough 150 mL 0    albuterol (PROVENTIL) (2.5 MG/3ML) 0.083% nebulizer solution Take 3 mLs by nebulization 4 times daily as needed for Wheezing 120 each 3    Sodium Hypochlorite (DAKINS) 0.25 % SOLN Irrigate

## 2023-09-26 ENCOUNTER — TELEPHONE (OUTPATIENT)
Dept: UROLOGY | Facility: CLINIC | Age: 53
End: 2023-09-26
Payer: COMMERCIAL

## 2023-09-26 NOTE — TELEPHONE ENCOUNTER
CALLED AND SPOKE WITH PATIENT REGARDING UROLOGY REFERRAL - PATIENT NOW FOLLOWS WITH A UROLOGIST AT THE [facility] - PREFERS TO KEEP

## 2023-10-23 PROBLEM — K63.0 ABSCESS OF SIGMOID COLON: Status: ACTIVE | Noted: 2023-10-23

## 2023-10-23 PROBLEM — J44.1 COPD EXACERBATION (MULTI): Status: ACTIVE | Noted: 2023-10-23

## 2023-10-23 PROBLEM — R93.5 ABNORMAL CT OF THE ABDOMEN: Status: ACTIVE | Noted: 2023-10-23

## 2023-10-23 RX ORDER — PREDNISONE 20 MG/1
40 TABLET ORAL DAILY
COMMUNITY
Start: 2020-11-16 | End: 2023-10-25 | Stop reason: ALTCHOICE

## 2023-10-23 RX ORDER — OXYBUTYNIN CHLORIDE 15 MG/1
15 TABLET, EXTENDED RELEASE ORAL DAILY
COMMUNITY
Start: 2023-08-04 | End: 2023-10-25 | Stop reason: ALTCHOICE

## 2023-10-23 RX ORDER — TIOTROPIUM BROMIDE AND OLODATEROL 3.124; 2.736 UG/1; UG/1
2 SPRAY, METERED RESPIRATORY (INHALATION) DAILY
COMMUNITY
End: 2023-10-25 | Stop reason: ALTCHOICE

## 2023-10-23 RX ORDER — FLUTICASONE PROPIONATE AND SALMETEROL 250; 50 UG/1; UG/1
1 POWDER RESPIRATORY (INHALATION)
COMMUNITY
Start: 2023-09-03

## 2023-10-23 RX ORDER — BENZONATATE 200 MG/1
200 CAPSULE ORAL 3 TIMES DAILY PRN
COMMUNITY
Start: 2020-11-16 | End: 2023-10-25 | Stop reason: ALTCHOICE

## 2023-10-23 RX ORDER — ALBUTEROL SULFATE 90 UG/1
2 AEROSOL, METERED RESPIRATORY (INHALATION) 4 TIMES DAILY PRN
COMMUNITY

## 2023-10-23 RX ORDER — VARENICLINE TARTRATE 1 MG/1
1 TABLET, FILM COATED ORAL 2 TIMES DAILY
COMMUNITY
Start: 2023-08-02 | End: 2023-10-25 | Stop reason: ALTCHOICE

## 2023-10-23 RX ORDER — BUPROPION HYDROCHLORIDE 150 MG/1
150 TABLET, EXTENDED RELEASE ORAL DAILY
COMMUNITY
Start: 2023-09-15 | End: 2023-10-25 | Stop reason: ALTCHOICE

## 2023-10-23 RX ORDER — IPRATROPIUM BROMIDE AND ALBUTEROL SULFATE 2.5; .5 MG/3ML; MG/3ML
3 SOLUTION RESPIRATORY (INHALATION)
COMMUNITY
Start: 2023-09-16 | End: 2023-10-25 | Stop reason: ALTCHOICE

## 2023-10-25 ENCOUNTER — LAB (OUTPATIENT)
Dept: LAB | Facility: LAB | Age: 53
End: 2023-10-25
Payer: COMMERCIAL

## 2023-10-25 ENCOUNTER — OFFICE VISIT (OUTPATIENT)
Dept: CARDIOLOGY | Facility: CLINIC | Age: 53
End: 2023-10-25
Payer: COMMERCIAL

## 2023-10-25 VITALS
BODY MASS INDEX: 16.96 KG/M2 | HEIGHT: 72 IN | HEART RATE: 51 BPM | DIASTOLIC BLOOD PRESSURE: 64 MMHG | WEIGHT: 125.2 LBS | SYSTOLIC BLOOD PRESSURE: 124 MMHG

## 2023-10-25 DIAGNOSIS — J43.9 PULMONARY EMPHYSEMA, UNSPECIFIED EMPHYSEMA TYPE (MULTI): ICD-10-CM

## 2023-10-25 DIAGNOSIS — R07.9 CHEST PAIN, UNSPECIFIED TYPE: ICD-10-CM

## 2023-10-25 DIAGNOSIS — R63.6 UNDERWEIGHT: ICD-10-CM

## 2023-10-25 DIAGNOSIS — Z13.220 LIPID SCREENING: ICD-10-CM

## 2023-10-25 DIAGNOSIS — R00.1 SINUS BRADYCARDIA: ICD-10-CM

## 2023-10-25 DIAGNOSIS — I25.10 CORONARY ARTERY DISEASE INVOLVING NATIVE CORONARY ARTERY OF NATIVE HEART, UNSPECIFIED WHETHER ANGINA PRESENT: ICD-10-CM

## 2023-10-25 PROBLEM — F17.200 CURRENT SMOKER: Status: ACTIVE | Noted: 2023-10-25

## 2023-10-25 LAB
CHOLEST SERPL-MCNC: 155 MG/DL (ref 0–199)
CHOLESTEROL/HDL RATIO: 4
HDLC SERPL-MCNC: 38.5 MG/DL
LDLC SERPL CALC-MCNC: 103 MG/DL
NON HDL CHOLESTEROL: 117 MG/DL (ref 0–149)
TRIGL SERPL-MCNC: 67 MG/DL (ref 0–149)
VLDL: 13 MG/DL (ref 0–40)

## 2023-10-25 PROCEDURE — 80061 LIPID PANEL: CPT

## 2023-10-25 PROCEDURE — 99214 OFFICE O/P EST MOD 30 MIN: CPT | Performed by: INTERNAL MEDICINE

## 2023-10-25 PROCEDURE — 93000 ELECTROCARDIOGRAM COMPLETE: CPT | Performed by: INTERNAL MEDICINE

## 2023-10-25 PROCEDURE — 36415 COLL VENOUS BLD VENIPUNCTURE: CPT

## 2023-10-25 RX ORDER — ATORVASTATIN CALCIUM 20 MG/1
20 TABLET, FILM COATED ORAL DAILY
Qty: 90 TABLET | Refills: 3 | Status: SHIPPED | OUTPATIENT
Start: 2023-10-25 | End: 2023-11-22 | Stop reason: SDUPTHER

## 2023-10-25 RX ORDER — ASPIRIN 81 MG/1
81 TABLET ORAL DAILY
COMMUNITY

## 2023-10-25 NOTE — PROGRESS NOTES
CARDIOLOGY OFFICE VISIT      CHIEF COMPLAINT  Chest pain    HISTORY OF PRESENT ILLNESS  The patient is being seen today for cardiac evaluation because of chest discomfort.  The patient states that recently he has had a few episodes of chest discomfort.  They are retrosternal in location.  He describes the pain as sharp and stabbing and throbbing.  He has tingling in his right arm when he has this.  Nothing seems to precipitate these episodes.  He denies any significant dyspnea.  He denies palpitations syncope.  He states he is physically quite active.  He did go to the emergency room the end of September to evaluate the chest discomfort.  He states they sent him home and everything checked out satisfactory.  He did have a CT scan of the chest performed.  This demonstrated some calcification of the coronary arteries.  I explained to him and his wife what this means.  I did recommend he undergo nuclear stress testing.  Its been 3 years since he had his lipids checked.  I told him he needs to continue his low-dose aspirin is recently started on and be started on a statin because of his coronary artery disease.  He does have asymptomatic bradycardia heart rate 51 bpm today.  He states he has had this for some time.  I also told him there were some nodules on his CT scan of the chest and they recommended repeat CT scan in 3 to 6 months.  He states he is following this up with his pulmonary doctor.    EKG: Sinus bradycardia, 51 bpm, otherwise normal EKG, results ced with patient    Impression:  Chest pain, evaluate for myocardial ischemia  Coronary disease manifested by calcium of the coronary arteries on CT scan September 2023  Tobacco abuse  Emphysema  Underweight    Plan:  Continue low-dose aspirin  Start statin  Lipid profile today  Walking nuclear stress test  Return to office after nuclear stress test        Please excuse any errors in grammar or translation related to this dictation.  Voice recognition software  was utilized to prepare this document.          Past Medical History  Past Medical History:   Diagnosis Date    COPD (chronic obstructive pulmonary disease) (CMS/[facility])     Other pulmonary collapse 11/16/2020    Collapse, lung    Personal history of other diseases of the respiratory system     History of asthma    Personal history of other diseases of the respiratory system     History of pulmonary emphysema    Personal history of other diseases of the respiratory system     History of bronchitis    Personal history of pneumonia (recurrent)     History of pneumonia       Social History  Social History     Tobacco Use    Smoking status: Every Day     Packs/day: .25     Types: Cigarettes    Smokeless tobacco: Never   Substance Use Topics    Alcohol use: Not Currently    Drug use: Yes     Types: Marijuana       Family History     Family History   Problem Relation Name Age of Onset    Diabetes type II Mother      Hypertension Mother      Heart failure Mother      Cancer Father          skin canger        Allergies:  Allergies   Allergen Reactions    Clarithromycin Unknown    Penicillin Unknown    Pentobarbital Sodium Other    Red Dye Hives        Outpatient Medications:  Current Outpatient Medications   Medication Instructions    albuterol 90 mcg/actuation inhaler 2 puffs, inhalation, 4 times daily PRN    aspirin 81 mg, oral, Daily    Wixela Inhub 250-50 mcg/dose diskus inhaler 1 puff, inhalation, 2 times daily RT          REVIEW OF SYSTEMS  Review of Systems   All other systems reviewed and are negative.        VITALS  Vitals:    10/25/23 1142   BP: 124/64   Pulse: 51       PHYSICAL EXAM  Constitutional:       Appearance: Healthy appearance. Not in distress.   Eyes:      Conjunctiva/sclera: Conjunctivae normal.      Pupils: Pupils are equal, round, and reactive to light.   Neck:      Vascular: No JVR. JVD normal.   Pulmonary:      Effort: Pulmonary effort is normal.      Breath sounds: Normal breath sounds. No wheezing.  No rhonchi. No rales.   Chest:      Chest wall: Not tender to palpatation.   Cardiovascular:      PMI at left midclavicular line. Normal rate. Regular rhythm. Normal S1. Normal S2.       Murmurs: There is no murmur.      No gallop.  No click. No rub.   Pulses:     Intact distal pulses.   Edema:     Peripheral edema absent.   Abdominal:      Tenderness: There is no abdominal tenderness.   Musculoskeletal: Normal range of motion.         General: No tenderness.      Cervical back: Normal range of motion. Skin:     General: Skin is warm and dry.   Neurological:      General: No focal deficit present.      Mental Status: Alert and oriented to person, place and time.           ASSESSMENT AND PLAN  Diagnoses and all orders for this visit:  Coronary artery disease involving native coronary artery of native heart, unspecified whether angina present  Chest pain, unspecified type  Sinus bradycardia  Pulmonary emphysema, unspecified emphysema type (CMS/HCC)  Underweight  Lipid screening      [unfilled]

## 2023-10-25 NOTE — PATIENT INSTRUCTIONS
START Atorvastatin 20mg daily  Lab work to be done today to check cholesterol  Stress test will be scheduled and done in the office  Patient to follow up after testing.    Continue same medications/treatment.  Patient educated on proper medication use.  Please bring all medicines, vitamins and herbal supplements with you when you come to the office.    Jailene GAMBINO LPN, am scribing for and in the presence of Dr. Deejay Bland MD, FACC

## 2023-11-15 ENCOUNTER — TELEPHONE (OUTPATIENT)
Dept: CARDIOLOGY | Facility: CLINIC | Age: 53
End: 2023-11-15
Payer: COMMERCIAL

## 2023-11-15 ENCOUNTER — ANCILLARY PROCEDURE (OUTPATIENT)
Dept: RADIOLOGY | Facility: CLINIC | Age: 53
End: 2023-11-15
Payer: COMMERCIAL

## 2023-11-15 ENCOUNTER — HOSPITAL ENCOUNTER (OUTPATIENT)
Dept: CARDIOLOGY | Facility: CLINIC | Age: 53
Discharge: HOME | End: 2023-11-15
Payer: COMMERCIAL

## 2023-11-15 DIAGNOSIS — R07.9 CHEST PAIN, UNSPECIFIED TYPE: ICD-10-CM

## 2023-11-15 DIAGNOSIS — I25.10 CORONARY ARTERY DISEASE INVOLVING NATIVE CORONARY ARTERY, UNSPECIFIED WHETHER ANGINA PRESENT, UNSPECIFIED WHETHER NATIVE OR TRANSPLANTED HEART: ICD-10-CM

## 2023-11-15 DIAGNOSIS — R07.9 CHEST PAIN, UNSPECIFIED TYPE: Primary | ICD-10-CM

## 2023-11-15 DIAGNOSIS — I25.10 CORONARY ARTERY DISEASE INVOLVING NATIVE CORONARY ARTERY OF NATIVE HEART, UNSPECIFIED WHETHER ANGINA PRESENT: ICD-10-CM

## 2023-11-15 PROCEDURE — A9502 TC99M TETROFOSMIN: HCPCS | Performed by: INTERNAL MEDICINE

## 2023-11-15 PROCEDURE — 78452 HT MUSCLE IMAGE SPECT MULT: CPT | Performed by: INTERNAL MEDICINE

## 2023-11-15 PROCEDURE — 93017 CV STRESS TEST TRACING ONLY: CPT

## 2023-11-15 PROCEDURE — 3430000001 HC RX 343 DIAGNOSTIC RADIOPHARMACEUTICALS: Performed by: INTERNAL MEDICINE

## 2023-11-15 PROCEDURE — 93016 CV STRESS TEST SUPVJ ONLY: CPT | Performed by: INTERNAL MEDICINE

## 2023-11-15 PROCEDURE — 78452 HT MUSCLE IMAGE SPECT MULT: CPT

## 2023-11-15 PROCEDURE — 93018 CV STRESS TEST I&R ONLY: CPT | Performed by: INTERNAL MEDICINE

## 2023-11-15 PROCEDURE — 2500000004 HC RX 250 GENERAL PHARMACY W/ HCPCS (ALT 636 FOR OP/ED): Performed by: INTERNAL MEDICINE

## 2023-11-15 RX ORDER — REGADENOSON 0.08 MG/ML
0.4 INJECTION, SOLUTION INTRAVENOUS ONCE
Status: COMPLETED | OUTPATIENT
Start: 2023-11-15 | End: 2023-11-15

## 2023-11-15 RX ADMIN — TETROFOSMIN 33 MILLICURIE: 0.23 INJECTION, POWDER, LYOPHILIZED, FOR SOLUTION INTRAVENOUS at 09:19

## 2023-11-15 RX ADMIN — REGADENOSON 0.4 MG: 0.08 INJECTION, SOLUTION INTRAVENOUS at 09:18

## 2023-11-15 RX ADMIN — TETROFOSMIN 11.3 MILLICURIE: 0.23 INJECTION, POWDER, LYOPHILIZED, FOR SOLUTION INTRAVENOUS at 08:10

## 2023-11-15 NOTE — TELEPHONE ENCOUNTER
----- Message from Jailene Duarte LPN sent at 11/15/2023  1:51 PM EST -----    ----- Message -----  From: Deejay Bland MD  Sent: 11/15/2023  12:14 PM EST  To: Jailene Duarte LPN    normal  ----- Message -----  From: Interface, Radiology Results In  Sent: 11/15/2023  11:51 AM EST  To: Deejay Bland MD

## 2023-11-22 ENCOUNTER — OFFICE VISIT (OUTPATIENT)
Dept: CARDIOLOGY | Facility: CLINIC | Age: 53
End: 2023-11-22
Payer: COMMERCIAL

## 2023-11-22 VITALS
BODY MASS INDEX: 17.73 KG/M2 | DIASTOLIC BLOOD PRESSURE: 62 MMHG | HEART RATE: 58 BPM | WEIGHT: 128.9 LBS | SYSTOLIC BLOOD PRESSURE: 118 MMHG

## 2023-11-22 DIAGNOSIS — F17.200 CURRENT SMOKER: ICD-10-CM

## 2023-11-22 DIAGNOSIS — I25.10 CORONARY ARTERY DISEASE INVOLVING NATIVE CORONARY ARTERY OF NATIVE HEART, UNSPECIFIED WHETHER ANGINA PRESENT: ICD-10-CM

## 2023-11-22 DIAGNOSIS — R63.6 UNDERWEIGHT: ICD-10-CM

## 2023-11-22 DIAGNOSIS — J43.9 PULMONARY EMPHYSEMA, UNSPECIFIED EMPHYSEMA TYPE (MULTI): ICD-10-CM

## 2023-11-22 DIAGNOSIS — R07.9 CHEST PAIN, UNSPECIFIED TYPE: ICD-10-CM

## 2023-11-22 PROCEDURE — 4004F PT TOBACCO SCREEN RCVD TLK: CPT | Performed by: INTERNAL MEDICINE

## 2023-11-22 PROCEDURE — 99214 OFFICE O/P EST MOD 30 MIN: CPT | Performed by: INTERNAL MEDICINE

## 2023-11-22 RX ORDER — ATORVASTATIN CALCIUM 40 MG/1
40 TABLET, FILM COATED ORAL DAILY
Qty: 90 TABLET | Refills: 3 | Status: SHIPPED | OUTPATIENT
Start: 2023-11-22 | End: 2024-11-21

## 2023-11-22 NOTE — PROGRESS NOTES
CARDIOLOGY OFFICE VISIT      CHIEF COMPLAINT      HISTORY OF PRESENT ILLNESS  The patient states he is doing well.  I did go over results of his nuclear stress test with him.  This is normal.  I explained to him that this means that most likely he does not have any high-grade coronary lesions.  I do not believe his chest discomfort is secondary to myocardial ischemia.  He has no new symptoms or problems.  I did go over his lipid profile with him done about a month ago.  I told him I would like to increase his atorvastatin and recheck his lipids in 2 months.  He states he is going to have a lot of teeth extracted.  He wants to make sure he is okay from a cardiac standpoint for that.  I told him he is okay for that.  I recommend he hold his aspirin for 1 week.      Impression:  Chest pain, noncardiac  Coronary disease manifested by calcium of the coronary arteries on CT scan September 2023  Tobacco abuse  Emphysema  Underweight  Hyperlipidemia    Please excuse any errors in grammar or translation related to this dictation.  Voice recognition software was utilized to prepare this document.     Past Medical History  Past Medical History:   Diagnosis Date    COPD (chronic obstructive pulmonary disease) (CMS/[facility])     Other pulmonary collapse 11/16/2020    Collapse, lung    Personal history of other diseases of the respiratory system     History of asthma    Personal history of other diseases of the respiratory system     History of pulmonary emphysema    Personal history of other diseases of the respiratory system     History of bronchitis    Personal history of pneumonia (recurrent)     History of pneumonia       Social History  Social History     Tobacco Use    Smoking status: Every Day     Packs/day: .25     Types: Cigarettes     Passive exposure: Never    Smokeless tobacco: Never   Substance Use Topics    Alcohol use: Not Currently    Drug use: Yes     Types: Marijuana       Family History     Family History   Problem  Relation Name Age of Onset    Diabetes type II Mother      Hypertension Mother      Heart failure Mother      Cancer Father          skin canger        Allergies:  Allergies   Allergen Reactions    Sodium Phenylbutyrate Cardiac arrhythmia/arrest    Clarithromycin Unknown    Penicillin Unknown    Pentobarbital Sodium Other    Red Dye Hives        Outpatient Medications:  Current Outpatient Medications   Medication Instructions    albuterol 90 mcg/actuation inhaler 2 puffs, inhalation, 4 times daily PRN    aspirin 81 mg, oral, Daily    atorvastatin (LIPITOR) 20 mg, oral, Daily    Wixela Inhub 250-50 mcg/dose diskus inhaler 1 puff, inhalation, 2 times daily RT          REVIEW OF SYSTEMS  Review of Systems   All other systems reviewed and are negative.        VITALS  Vitals:    11/22/23 0951   BP: 118/62   Pulse: 58       PHYSICAL EXAM  Vitals reviewed.   Constitutional:       Appearance: Normal and healthy appearance. Well-developed and not in distress.   Eyes:      Conjunctiva/sclera: Conjunctivae normal.      Pupils: Pupils are equal, round, and reactive to light.   Neck:      Vascular: No JVR. JVD normal.   Pulmonary:      Effort: Pulmonary effort is normal.      Breath sounds: Normal breath sounds. No wheezing. No rhonchi. No rales.   Chest:      Chest wall: Not tender to palpatation.   Cardiovascular:      PMI at left midclavicular line. Normal rate. Regular rhythm. Normal S1. Normal S2.       Murmurs: There is no murmur.      No gallop.  No click. No rub.   Pulses:     Intact distal pulses.   Edema:     Peripheral edema absent.   Abdominal:      Tenderness: There is no abdominal tenderness.   Musculoskeletal: Normal range of motion.         General: No tenderness.      Cervical back: Normal range of motion. Skin:     General: Skin is warm and dry.   Neurological:      General: No focal deficit present.      Mental Status: Alert and oriented to person, place and time.   Psychiatric:         Behavior: Behavior is  cooperative.         ASSESSMENT AND PLAN      [unfilled]

## 2023-11-22 NOTE — PATIENT INSTRUCTIONS
Reviewed stress test results with pt.  Increase Atorvastatin to 40 mg 1 tablet by mouth daily  Per Dr. Bland you may hold aspirin 7 days prior to tooth extraction.  You will need to have your Cholesterol checked in 2 month and again in 1 year prior to your office visit.  Patient educated on proper medication use.   Patient educated on risk factor modification.   Please bring any lab results from other providers / physicians to your next appointment.     Please bring all medicines, vitamins, and herbal supplements with you when you come to the office.     Prescriptions will not be filled unless you are compliant with your follow up appointments or have a follow up appointment scheduled as per instruction of your physician. Refills should be requested at the time of your visit.     Phuong GAMBINO LPN, am scribing for and in the presence of Dr. Deejay Bland

## 2024-09-25 ENCOUNTER — APPOINTMENT (OUTPATIENT)
Dept: RADIOLOGY | Facility: HOSPITAL | Age: 54
End: 2024-09-25
Payer: COMMERCIAL

## 2024-10-18 ENCOUNTER — HOSPITAL ENCOUNTER (OUTPATIENT)
Dept: RADIOLOGY | Facility: CLINIC | Age: 54
Discharge: HOME | End: 2024-10-18
Payer: COMMERCIAL

## 2024-10-18 DIAGNOSIS — R63.4 ABNORMAL WEIGHT LOSS: ICD-10-CM

## 2024-10-18 DIAGNOSIS — R91.1 SOLITARY PULMONARY NODULE: ICD-10-CM

## 2024-10-18 PROCEDURE — 71260 CT THORAX DX C+: CPT

## 2024-10-18 PROCEDURE — 2550000001 HC RX 255 CONTRASTS: Performed by: SPECIALIST

## 2024-12-04 DIAGNOSIS — I25.10 CORONARY ARTERY DISEASE INVOLVING NATIVE CORONARY ARTERY OF NATIVE HEART, UNSPECIFIED WHETHER ANGINA PRESENT: ICD-10-CM

## 2024-12-04 RX ORDER — ATORVASTATIN CALCIUM 40 MG/1
40 TABLET, FILM COATED ORAL DAILY
Qty: 90 TABLET | Refills: 3 | Status: SHIPPED | OUTPATIENT
Start: 2024-12-04

## 2024-12-04 NOTE — TELEPHONE ENCOUNTER
Received request for prescription refills for patient.   Patient follows with Dr. Bland     Request is for Atorvastatin 40mg QD  Is patient currently on medication yes    Last OV 11/22/23  Next OV needs OV, sec'y notified    Pended for signing and sent to provider

## 2024-12-23 ENCOUNTER — APPOINTMENT (OUTPATIENT)
Dept: CARDIOLOGY | Facility: CLINIC | Age: 54
End: 2024-12-23
Payer: COMMERCIAL

## 2024-12-23 VITALS
HEART RATE: 56 BPM | DIASTOLIC BLOOD PRESSURE: 68 MMHG | BODY MASS INDEX: 18.63 KG/M2 | HEIGHT: 71 IN | SYSTOLIC BLOOD PRESSURE: 120 MMHG | WEIGHT: 133.1 LBS

## 2024-12-23 DIAGNOSIS — E78.2 MIXED HYPERLIPIDEMIA: ICD-10-CM

## 2024-12-23 DIAGNOSIS — R00.1 SINUS BRADYCARDIA: ICD-10-CM

## 2024-12-23 DIAGNOSIS — F17.200 CURRENT SMOKER: ICD-10-CM

## 2024-12-23 DIAGNOSIS — I25.10 CORONARY ARTERY DISEASE INVOLVING NATIVE CORONARY ARTERY OF NATIVE HEART, UNSPECIFIED WHETHER ANGINA PRESENT: ICD-10-CM

## 2024-12-23 PROBLEM — J43.8 OTHER EMPHYSEMA (MULTI): Status: ACTIVE | Noted: 2024-12-23

## 2024-12-23 PROCEDURE — 3008F BODY MASS INDEX DOCD: CPT | Performed by: INTERNAL MEDICINE

## 2024-12-23 PROCEDURE — 99214 OFFICE O/P EST MOD 30 MIN: CPT | Performed by: INTERNAL MEDICINE

## 2024-12-23 RX ORDER — FLUOCINONIDE 1 MG/G
CREAM TOPICAL
COMMUNITY
Start: 2024-12-17

## 2024-12-23 RX ORDER — ATORVASTATIN CALCIUM 40 MG/1
40 TABLET, FILM COATED ORAL DAILY
Qty: 90 TABLET | Refills: 3 | Status: SHIPPED | OUTPATIENT
Start: 2024-12-23 | End: 2025-12-23

## 2024-12-23 ASSESSMENT — ENCOUNTER SYMPTOMS
WHEEZING: 0
CLAUDICATION: 0
SHORTNESS OF BREATH: 0
DYSPNEA ON EXERTION: 1
ORTHOPNEA: 0
IRREGULAR HEARTBEAT: 0
SNORING: 0
SYNCOPE: 0
PND: 0
NEAR-SYNCOPE: 0
COUGH: 0

## 2024-12-23 NOTE — PATIENT INSTRUCTIONS
Fasting labs to be drawn in the near future for cholesterol  Follow up in 1 year  Continue same medications and treatments.   Patient educated on proper medication use.   Patient educated on risk factor modification.   Please bring any lab results from other providers / physicians to your next appointment.     Please bring all medicines, vitamins, and herbal supplements with you when you come to the office.     Prescriptions will not be filled unless you are compliant with your follow up appointments or have a follow up appointment scheduled as per instruction of your physician. Refills should be requested at the time of your visit.  INoemi LPN, am scribing for and in the presence of Dr. Deejay Bland MD

## 2024-12-23 NOTE — PROGRESS NOTES
CARDIOLOGY OFFICE VISIT      CHIEF COMPLAINT      HISTORY OF PRESENT ILLNESS  The patient denies chest discomfort or symptoms to suggest myocardial ischemia.  He does have dyspnea with physical activities unchanged from the past.  I told him this is due to his continued tobacco abuse and emphysema.  He understands.  He denies pretibial edema.  Denies palpitations and syncope.  He has not had his lipids checked for over a year.  I told him to get lab work done and we will call him with results.      Impression:  Chest pain, noncardiac  Coronary disease manifested by calcium of the coronary arteries on CT scan September 2023  Tobacco abuse  Emphysema  Underweight  Hyperlipidemia     Please excuse any errors in grammar or translation related to this dictation.  Voice recognition software was utilized to prepare this document.        Past Medical History  Past Medical History:   Diagnosis Date    COPD (chronic obstructive pulmonary disease) ([facility])     Other pulmonary collapse 11/16/2020    Collapse, lung    Personal history of other diseases of the respiratory system     History of asthma    Personal history of other diseases of the respiratory system     History of pulmonary emphysema    Personal history of other diseases of the respiratory system     History of bronchitis    Personal history of pneumonia (recurrent)     History of pneumonia       Social History  Social History     Tobacco Use    Smoking status: Every Day     Current packs/day: 0.25     Average packs/day: 0.2 packs/day for 45.0 years (11.2 ttl pk-yrs)     Types: Cigarettes     Start date: 1980     Passive exposure: Never    Smokeless tobacco: Never   Substance Use Topics    Alcohol use: Not Currently    Drug use: Yes     Frequency: 7.0 times per week     Types: Marijuana     Comment: daily       Family History     Family History   Problem Relation Name Age of Onset    Diabetes type II Mother      Hypertension Mother      Heart failure Mother       Cancer Father          skin canger        Allergies:  Allergies   Allergen Reactions    Sodium Phenylbutyrate Cardiac arrhythmia/arrest    Clarithromycin Unknown    Penicillin Unknown    Pentobarbital Sodium Other    Red Dye Hives        Outpatient Medications:  Current Outpatient Medications   Medication Instructions    albuterol 90 mcg/actuation inhaler 2 puffs, 4 times daily PRN    aspirin 81 mg, Daily    atorvastatin (LIPITOR) 40 mg, oral, Daily    fluocinonide (LIDEX) 0.1 % cream APPLY TO THE AFFECTED AREA ON LEFT FOOT TWICE DAILY    Wixela Inhub 250-50 mcg/dose diskus inhaler 1 puff, 2 times daily RT          REVIEW OF SYSTEMS  Review of Systems   Constitutional: Negative for malaise/fatigue.   Cardiovascular:  Positive for dyspnea on exertion. Negative for claudication, cyanosis, irregular heartbeat, leg swelling, near-syncope, orthopnea, paroxysmal nocturnal dyspnea and syncope.   Respiratory:  Negative for cough, shortness of breath, snoring and wheezing.    All other systems reviewed and are negative.        VITALS  Vitals:    12/23/24 1434   BP: 120/68   Pulse: 56       PHYSICAL EXAM  Constitutional:       Appearance: Normal and healthy appearance. Well-developed and not in distress.   Neck:      Vascular: No JVR. JVD normal.   Pulmonary:      Effort: Pulmonary effort is normal.      Breath sounds: Normal breath sounds. No wheezing. No rhonchi. No rales.   Chest:      Chest wall: Not tender to palpatation.   Cardiovascular:      PMI at left midclavicular line. Normal rate. Regular rhythm. Normal S1. Normal S2.       Murmurs: There is no murmur.      No gallop.  No click. No rub.   Pulses:     Intact distal pulses.   Edema:     Peripheral edema absent.   Abdominal:      Tenderness: There is no abdominal tenderness.   Musculoskeletal: Normal range of motion.         General: No tenderness. Skin:     General: Skin is warm and dry.   Neurological:      General: No focal deficit present.      Mental Status:  Alert and oriented to person, place and time.           ASSESSMENT AND PLAN  Diagnoses and all orders for this visit:  Coronary artery disease involving native coronary artery of native heart, unspecified whether angina present  Sinus bradycardia  Current smoker  Mixed hyperlipidemia  Body mass index (BMI) less than 18.5      [unfilled]

## 2025-02-17 LAB
CHOLEST SERPL-MCNC: 126 MG/DL
CHOLEST/HDLC SERPL: 3 (CALC)
HDLC SERPL-MCNC: 42 MG/DL
LDLC SERPL CALC-MCNC: 69 MG/DL (CALC)
NONHDLC SERPL-MCNC: 84 MG/DL (CALC)
TRIGL SERPL-MCNC: 74 MG/DL

## 2025-02-18 ENCOUNTER — TELEPHONE (OUTPATIENT)
Dept: CARDIOLOGY | Facility: CLINIC | Age: 55
End: 2025-02-18
Payer: COMMERCIAL

## 2025-02-18 NOTE — TELEPHONE ENCOUNTER
----- Message from Nurse Jailene BENAVIDES sent at 2/18/2025  9:05 AM EST -----    ----- Message -----  From: Deejay Bland MD  Sent: 2/18/2025   8:34 AM EST  To: Jailene Duarte LPN    Lipids very good  ----- Message -----  From: Reuben, [employer] Results In  Sent: 2/17/2025   9:35 PM EST  To: Deejay Bland MD

## 2025-12-17 ENCOUNTER — APPOINTMENT (OUTPATIENT)
Dept: CARDIOLOGY | Facility: CLINIC | Age: 55
End: 2025-12-17
Payer: COMMERCIAL